# Patient Record
Sex: MALE | HISPANIC OR LATINO | Employment: FULL TIME | URBAN - METROPOLITAN AREA
[De-identification: names, ages, dates, MRNs, and addresses within clinical notes are randomized per-mention and may not be internally consistent; named-entity substitution may affect disease eponyms.]

---

## 2017-08-17 ENCOUNTER — HOSPITAL ENCOUNTER (OUTPATIENT)
Dept: LAB | Facility: MEDICAL CENTER | Age: 62
End: 2017-08-17
Attending: FAMILY MEDICINE
Payer: COMMERCIAL

## 2017-08-17 LAB
CA-I SERPL-SCNC: 1.2 MMOL/L (ref 1.1–1.3)
EST. AVERAGE GLUCOSE BLD GHB EST-MCNC: 123 MG/DL
HBA1C MFR BLD: 5.9 % (ref 0–5.6)
PTH-INTACT SERPL-MCNC: 69.9 PG/ML (ref 14–72)

## 2017-08-17 PROCEDURE — 36415 COLL VENOUS BLD VENIPUNCTURE: CPT

## 2017-08-17 PROCEDURE — 83970 ASSAY OF PARATHORMONE: CPT

## 2017-08-17 PROCEDURE — 83036 HEMOGLOBIN GLYCOSYLATED A1C: CPT

## 2017-08-17 PROCEDURE — 82330 ASSAY OF CALCIUM: CPT

## 2020-06-03 ENCOUNTER — APPOINTMENT (OUTPATIENT)
Dept: RADIOLOGY | Facility: MEDICAL CENTER | Age: 65
End: 2020-06-03
Attending: EMERGENCY MEDICINE
Payer: COMMERCIAL

## 2020-06-03 ENCOUNTER — HOSPITAL ENCOUNTER (EMERGENCY)
Facility: MEDICAL CENTER | Age: 65
End: 2020-06-03
Attending: EMERGENCY MEDICINE
Payer: COMMERCIAL

## 2020-06-03 ENCOUNTER — OFFICE VISIT (OUTPATIENT)
Dept: URGENT CARE | Facility: PHYSICIAN GROUP | Age: 65
End: 2020-06-03
Payer: COMMERCIAL

## 2020-06-03 VITALS
TEMPERATURE: 98.1 F | HEART RATE: 71 BPM | OXYGEN SATURATION: 96 % | RESPIRATION RATE: 16 BRPM | DIASTOLIC BLOOD PRESSURE: 76 MMHG | SYSTOLIC BLOOD PRESSURE: 126 MMHG

## 2020-06-03 VITALS
RESPIRATION RATE: 13 BRPM | HEART RATE: 59 BPM | HEIGHT: 69 IN | WEIGHT: 237.88 LBS | SYSTOLIC BLOOD PRESSURE: 149 MMHG | TEMPERATURE: 97.5 F | OXYGEN SATURATION: 97 % | DIASTOLIC BLOOD PRESSURE: 83 MMHG | BODY MASS INDEX: 35.23 KG/M2

## 2020-06-03 DIAGNOSIS — R47.81 SLURRED SPEECH: ICD-10-CM

## 2020-06-03 DIAGNOSIS — R29.810 FACIAL DROOP: ICD-10-CM

## 2020-06-03 DIAGNOSIS — G51.0 BELL'S PALSY: ICD-10-CM

## 2020-06-03 PROCEDURE — 99204 OFFICE O/P NEW MOD 45 MIN: CPT | Performed by: PHYSICIAN ASSISTANT

## 2020-06-03 PROCEDURE — 70450 CT HEAD/BRAIN W/O DYE: CPT

## 2020-06-03 PROCEDURE — 99283 EMERGENCY DEPT VISIT LOW MDM: CPT

## 2020-06-03 RX ORDER — PREDNISONE 20 MG/1
60 TABLET ORAL DAILY
Qty: 15 TAB | Refills: 0 | Status: SHIPPED | OUTPATIENT
Start: 2020-06-03 | End: 2020-06-08

## 2020-06-03 RX ORDER — ACYCLOVIR 200 MG/1
800 CAPSULE ORAL 3 TIMES DAILY
Qty: 120 CAP | Refills: 0 | Status: SHIPPED | OUTPATIENT
Start: 2020-06-03 | End: 2020-06-13

## 2020-06-03 RX ORDER — LOSARTAN POTASSIUM 100 MG/1
100 TABLET ORAL DAILY
Status: ON HOLD | COMMUNITY
Start: 2020-06-01 | End: 2021-09-26

## 2020-06-03 ASSESSMENT — ENCOUNTER SYMPTOMS
SINUS PAIN: 0
ANOREXIA: 0
CHILLS: 0
SORE THROAT: 0
VISUAL CHANGE: 0
DIARRHEA: 0
FEVER: 0
SPEECH CHANGE: 1
NAUSEA: 0
VERTIGO: 0
FATIGUE: 0
MEMORY LOSS: 0
NUMBNESS: 0
RESPIRATORY NEGATIVE: 1
WEAKNESS: 0
VOMITING: 0
ABDOMINAL PAIN: 0

## 2020-06-03 ASSESSMENT — LIFESTYLE VARIABLES: DO YOU DRINK ALCOHOL: NO

## 2020-06-03 NOTE — ED TRIAGE NOTES
Chief Complaint   Patient presents with   • Numbness     in left side of face x 2 days. worse today   • Facial Droop     in left side of face x 2 days. worse today. pt unable to blink/close left eye. no unilateral weakness noted. no slurred speech. no drift. (-)neglect (-)ataxia. aaox4.     Educated on triage process. Instructed to notify staff for any worsening symptoms. Denies any recent travel. Denies exposure to known covid positive patients. Denies any respiratory symptoms.
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
## 2020-06-03 NOTE — ED NOTES
Pt to . Agree with triage note. Pt sent from . He has even and equal , BLE. Denies headache. Denies vision issues. ERP at bedside.
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
## 2020-06-03 NOTE — ED NOTES
Med Rec complete per phone interview with pt's wife  Allergies Reviewed  No ABX in the last 14 days

## 2020-08-12 ENCOUNTER — HOSPITAL ENCOUNTER (OUTPATIENT)
Dept: LAB | Facility: MEDICAL CENTER | Age: 65
End: 2020-08-12
Attending: PSYCHIATRY & NEUROLOGY
Payer: COMMERCIAL

## 2020-08-12 LAB
ALBUMIN SERPL BCP-MCNC: 3.7 G/DL (ref 3.2–4.9)
ALBUMIN/GLOB SERPL: 1.2 G/DL
ALP SERPL-CCNC: 111 U/L (ref 30–99)
ALT SERPL-CCNC: 42 U/L (ref 2–50)
ANION GAP SERPL CALC-SCNC: 10 MMOL/L (ref 7–16)
AST SERPL-CCNC: 45 U/L (ref 12–45)
BASOPHILS # BLD AUTO: 0.4 % (ref 0–1.8)
BASOPHILS # BLD: 0.02 K/UL (ref 0–0.12)
BILIRUB SERPL-MCNC: 1.3 MG/DL (ref 0.1–1.5)
BUN SERPL-MCNC: 11 MG/DL (ref 8–22)
CALCIUM SERPL-MCNC: 9.2 MG/DL (ref 8.5–10.5)
CHLORIDE SERPL-SCNC: 104 MMOL/L (ref 96–112)
CO2 SERPL-SCNC: 21 MMOL/L (ref 20–33)
CREAT SERPL-MCNC: 0.53 MG/DL (ref 0.5–1.4)
EOSINOPHIL # BLD AUTO: 0.11 K/UL (ref 0–0.51)
EOSINOPHIL NFR BLD: 2.4 % (ref 0–6.9)
ERYTHROCYTE [DISTWIDTH] IN BLOOD BY AUTOMATED COUNT: 46.5 FL (ref 35.9–50)
ERYTHROCYTE [SEDIMENTATION RATE] IN BLOOD BY WESTERGREN METHOD: 4 MM/HOUR (ref 0–20)
FOLATE SERPL-MCNC: 16.5 NG/ML
GLOBULIN SER CALC-MCNC: 3.2 G/DL (ref 1.9–3.5)
GLUCOSE SERPL-MCNC: 148 MG/DL (ref 65–99)
HCT VFR BLD AUTO: 41.7 % (ref 42–52)
HGB BLD-MCNC: 14.6 G/DL (ref 14–18)
IMM GRANULOCYTES # BLD AUTO: 0.02 K/UL (ref 0–0.11)
IMM GRANULOCYTES NFR BLD AUTO: 0.4 % (ref 0–0.9)
LYMPHOCYTES # BLD AUTO: 1.79 K/UL (ref 1–4.8)
LYMPHOCYTES NFR BLD: 38.7 % (ref 22–41)
MCH RBC QN AUTO: 33.4 PG (ref 27–33)
MCHC RBC AUTO-ENTMCNC: 35 G/DL (ref 33.7–35.3)
MCV RBC AUTO: 95.4 FL (ref 81.4–97.8)
MONOCYTES # BLD AUTO: 0.45 K/UL (ref 0–0.85)
MONOCYTES NFR BLD AUTO: 9.7 % (ref 0–13.4)
NEUTROPHILS # BLD AUTO: 2.23 K/UL (ref 1.82–7.42)
NEUTROPHILS NFR BLD: 48.4 % (ref 44–72)
NRBC # BLD AUTO: 0 K/UL
NRBC BLD-RTO: 0 /100 WBC
PLATELET # BLD AUTO: 111 K/UL (ref 164–446)
PMV BLD AUTO: 10.7 FL (ref 9–12.9)
POTASSIUM SERPL-SCNC: 3.9 MMOL/L (ref 3.6–5.5)
PROT SERPL-MCNC: 6.9 G/DL (ref 6–8.2)
RBC # BLD AUTO: 4.37 M/UL (ref 4.7–6.1)
SODIUM SERPL-SCNC: 135 MMOL/L (ref 135–145)
TREPONEMA PALLIDUM IGG+IGM AB [PRESENCE] IN SERUM OR PLASMA BY IMMUNOASSAY: NORMAL
VIT B12 SERPL-MCNC: 1736 PG/ML (ref 211–911)
WBC # BLD AUTO: 4.6 K/UL (ref 4.8–10.8)

## 2020-08-12 PROCEDURE — 80053 COMPREHEN METABOLIC PANEL: CPT

## 2020-08-12 PROCEDURE — 85025 COMPLETE CBC W/AUTO DIFF WBC: CPT

## 2020-08-12 PROCEDURE — 86038 ANTINUCLEAR ANTIBODIES: CPT

## 2020-08-12 PROCEDURE — 85652 RBC SED RATE AUTOMATED: CPT

## 2020-08-12 PROCEDURE — 82746 ASSAY OF FOLIC ACID SERUM: CPT

## 2020-08-12 PROCEDURE — 36415 COLL VENOUS BLD VENIPUNCTURE: CPT

## 2020-08-12 PROCEDURE — 82164 ANGIOTENSIN I ENZYME TEST: CPT

## 2020-08-12 PROCEDURE — 86780 TREPONEMA PALLIDUM: CPT

## 2020-08-12 PROCEDURE — 82607 VITAMIN B-12: CPT

## 2020-08-14 LAB
ACE SERPL-CCNC: 49 U/L (ref 9–67)
NUCLEAR IGG SER QL IA: NORMAL

## 2020-08-17 ENCOUNTER — HOSPITAL ENCOUNTER (OUTPATIENT)
Dept: RADIOLOGY | Facility: MEDICAL CENTER | Age: 65
End: 2020-08-17
Attending: PSYCHIATRY & NEUROLOGY
Payer: COMMERCIAL

## 2020-08-17 DIAGNOSIS — G51.0 BELL'S PALSY: ICD-10-CM

## 2020-08-17 PROCEDURE — A9576 INJ PROHANCE MULTIPACK: HCPCS | Performed by: PSYCHIATRY & NEUROLOGY

## 2020-08-17 PROCEDURE — 700117 HCHG RX CONTRAST REV CODE 255: Performed by: PSYCHIATRY & NEUROLOGY

## 2020-08-17 PROCEDURE — 70553 MRI BRAIN STEM W/O & W/DYE: CPT

## 2020-08-17 RX ADMIN — GADOTERIDOL 20 ML: 279.3 INJECTION, SOLUTION INTRAVENOUS at 17:55

## 2020-10-19 ENCOUNTER — PRE-ADMISSION TESTING (OUTPATIENT)
Dept: ADMISSIONS | Facility: MEDICAL CENTER | Age: 65
End: 2020-10-19
Attending: OPHTHALMOLOGY
Payer: COMMERCIAL

## 2020-10-19 DIAGNOSIS — Z01.810 PRE-OPERATIVE CARDIOVASCULAR EXAMINATION: ICD-10-CM

## 2020-10-19 DIAGNOSIS — Z01.812 PRE-OPERATIVE LABORATORY EXAMINATION: ICD-10-CM

## 2020-10-19 LAB
ANION GAP SERPL CALC-SCNC: 10 MMOL/L (ref 7–16)
BUN SERPL-MCNC: 10 MG/DL (ref 8–22)
CALCIUM SERPL-MCNC: 9 MG/DL (ref 8.5–10.5)
CHLORIDE SERPL-SCNC: 102 MMOL/L (ref 96–112)
CO2 SERPL-SCNC: 25 MMOL/L (ref 20–33)
COVID ORDER STATUS COVID19: NORMAL
CREAT SERPL-MCNC: 0.44 MG/DL (ref 0.5–1.4)
EKG IMPRESSION: NORMAL
GLUCOSE SERPL-MCNC: 95 MG/DL (ref 65–99)
POTASSIUM SERPL-SCNC: 4.2 MMOL/L (ref 3.6–5.5)
SARS-COV-2 RNA RESP QL NAA+PROBE: NOTDETECTED
SODIUM SERPL-SCNC: 137 MMOL/L (ref 135–145)
SPECIMEN SOURCE: NORMAL

## 2020-10-19 PROCEDURE — 36415 COLL VENOUS BLD VENIPUNCTURE: CPT

## 2020-10-19 PROCEDURE — 80048 BASIC METABOLIC PNL TOTAL CA: CPT

## 2020-10-19 PROCEDURE — C9803 HOPD COVID-19 SPEC COLLECT: HCPCS

## 2020-10-19 PROCEDURE — 93005 ELECTROCARDIOGRAM TRACING: CPT

## 2020-10-19 PROCEDURE — U0003 INFECTIOUS AGENT DETECTION BY NUCLEIC ACID (DNA OR RNA); SEVERE ACUTE RESPIRATORY SYNDROME CORONAVIRUS 2 (SARS-COV-2) (CORONAVIRUS DISEASE [COVID-19]), AMPLIFIED PROBE TECHNIQUE, MAKING USE OF HIGH THROUGHPUT TECHNOLOGIES AS DESCRIBED BY CMS-2020-01-R: HCPCS

## 2020-10-19 PROCEDURE — 93010 ELECTROCARDIOGRAM REPORT: CPT | Performed by: INTERNAL MEDICINE

## 2020-10-19 ASSESSMENT — FIBROSIS 4 INDEX: FIB4 SCORE: 3.94

## 2020-10-21 ENCOUNTER — HOSPITAL ENCOUNTER (OUTPATIENT)
Facility: MEDICAL CENTER | Age: 65
End: 2020-10-21
Attending: OPHTHALMOLOGY | Admitting: OPHTHALMOLOGY
Payer: COMMERCIAL

## 2020-10-21 ENCOUNTER — ANESTHESIA (OUTPATIENT)
Dept: SURGERY | Facility: MEDICAL CENTER | Age: 65
End: 2020-10-21
Payer: COMMERCIAL

## 2020-10-21 ENCOUNTER — ANESTHESIA EVENT (OUTPATIENT)
Dept: SURGERY | Facility: MEDICAL CENTER | Age: 65
End: 2020-10-21
Payer: COMMERCIAL

## 2020-10-21 VITALS
HEART RATE: 66 BPM | HEIGHT: 69 IN | SYSTOLIC BLOOD PRESSURE: 119 MMHG | DIASTOLIC BLOOD PRESSURE: 70 MMHG | BODY MASS INDEX: 33.99 KG/M2 | RESPIRATION RATE: 20 BRPM | TEMPERATURE: 99.2 F | WEIGHT: 229.5 LBS | OXYGEN SATURATION: 93 %

## 2020-10-21 PROCEDURE — 160002 HCHG RECOVERY MINUTES (STAT): Performed by: OPHTHALMOLOGY

## 2020-10-21 PROCEDURE — 501838 HCHG SUTURE GENERAL: Performed by: OPHTHALMOLOGY

## 2020-10-21 PROCEDURE — 160025 RECOVERY II MINUTES (STATS): Performed by: OPHTHALMOLOGY

## 2020-10-21 PROCEDURE — 160041 HCHG SURGERY MINUTES - EA ADDL 1 MIN LEVEL 4: Performed by: OPHTHALMOLOGY

## 2020-10-21 PROCEDURE — 700101 HCHG RX REV CODE 250: Performed by: OPHTHALMOLOGY

## 2020-10-21 PROCEDURE — A9270 NON-COVERED ITEM OR SERVICE: HCPCS | Performed by: OPHTHALMOLOGY

## 2020-10-21 PROCEDURE — 160046 HCHG PACU - 1ST 60 MINS PHASE II: Performed by: OPHTHALMOLOGY

## 2020-10-21 PROCEDURE — 700111 HCHG RX REV CODE 636 W/ 250 OVERRIDE (IP): Performed by: ANESTHESIOLOGY

## 2020-10-21 PROCEDURE — 700102 HCHG RX REV CODE 250 W/ 637 OVERRIDE(OP): Performed by: OPHTHALMOLOGY

## 2020-10-21 PROCEDURE — 700105 HCHG RX REV CODE 258: Performed by: OPHTHALMOLOGY

## 2020-10-21 PROCEDURE — 160035 HCHG PACU - 1ST 60 MINS PHASE I: Performed by: OPHTHALMOLOGY

## 2020-10-21 PROCEDURE — 160048 HCHG OR STATISTICAL LEVEL 1-5: Performed by: OPHTHALMOLOGY

## 2020-10-21 PROCEDURE — 160036 HCHG PACU - EA ADDL 30 MINS PHASE I: Performed by: OPHTHALMOLOGY

## 2020-10-21 PROCEDURE — 160009 HCHG ANES TIME/MIN: Performed by: OPHTHALMOLOGY

## 2020-10-21 PROCEDURE — 160029 HCHG SURGERY MINUTES - 1ST 30 MINS LEVEL 4: Performed by: OPHTHALMOLOGY

## 2020-10-21 RX ORDER — MIDAZOLAM HYDROCHLORIDE 1 MG/ML
INJECTION INTRAMUSCULAR; INTRAVENOUS PRN
Status: DISCONTINUED | OUTPATIENT
Start: 2020-10-21 | End: 2020-10-21 | Stop reason: SURG

## 2020-10-21 RX ORDER — CEFAZOLIN SODIUM 1 G/3ML
INJECTION, POWDER, FOR SOLUTION INTRAMUSCULAR; INTRAVENOUS PRN
Status: DISCONTINUED | OUTPATIENT
Start: 2020-10-21 | End: 2020-10-21 | Stop reason: SURG

## 2020-10-21 RX ORDER — ERYTHROMYCIN 5 MG/G
OINTMENT OPHTHALMIC
Status: DISCONTINUED | OUTPATIENT
Start: 2020-10-21 | End: 2020-10-21 | Stop reason: HOSPADM

## 2020-10-21 RX ORDER — SODIUM CHLORIDE, SODIUM LACTATE, POTASSIUM CHLORIDE, CALCIUM CHLORIDE 600; 310; 30; 20 MG/100ML; MG/100ML; MG/100ML; MG/100ML
INJECTION, SOLUTION INTRAVENOUS CONTINUOUS
Status: DISCONTINUED | OUTPATIENT
Start: 2020-10-21 | End: 2020-10-21 | Stop reason: HOSPADM

## 2020-10-21 RX ORDER — ONDANSETRON 2 MG/ML
4 INJECTION INTRAMUSCULAR; INTRAVENOUS
Status: DISCONTINUED | OUTPATIENT
Start: 2020-10-21 | End: 2020-10-21 | Stop reason: HOSPADM

## 2020-10-21 RX ORDER — HALOPERIDOL 5 MG/ML
1 INJECTION INTRAMUSCULAR
Status: DISCONTINUED | OUTPATIENT
Start: 2020-10-21 | End: 2020-10-21 | Stop reason: HOSPADM

## 2020-10-21 RX ORDER — LIDOCAINE HYDROCHLORIDE AND EPINEPHRINE BITARTRATE 20; .01 MG/ML; MG/ML
INJECTION, SOLUTION SUBCUTANEOUS
Status: DISCONTINUED | OUTPATIENT
Start: 2020-10-21 | End: 2020-10-21 | Stop reason: HOSPADM

## 2020-10-21 RX ORDER — ERYTHROMYCIN 5 MG/G
OINTMENT OPHTHALMIC
Status: DISCONTINUED
Start: 2020-10-21 | End: 2020-10-21 | Stop reason: HOSPADM

## 2020-10-21 RX ORDER — DIPHENHYDRAMINE HYDROCHLORIDE 50 MG/ML
12.5 INJECTION INTRAMUSCULAR; INTRAVENOUS
Status: DISCONTINUED | OUTPATIENT
Start: 2020-10-21 | End: 2020-10-21 | Stop reason: HOSPADM

## 2020-10-21 RX ADMIN — SODIUM CHLORIDE, POTASSIUM CHLORIDE, SODIUM LACTATE AND CALCIUM CHLORIDE: 600; 310; 30; 20 INJECTION, SOLUTION INTRAVENOUS at 13:55

## 2020-10-21 RX ADMIN — FENTANYL CITRATE 100 MCG: 50 INJECTION, SOLUTION INTRAMUSCULAR; INTRAVENOUS at 16:21

## 2020-10-21 RX ADMIN — PROPOFOL 120 MG: 10 INJECTION, EMULSION INTRAVENOUS at 16:17

## 2020-10-21 RX ADMIN — POVIDONE IODINE 15 ML: 100 SOLUTION TOPICAL at 13:55

## 2020-10-21 RX ADMIN — CEFAZOLIN 2 G: 330 INJECTION, POWDER, FOR SOLUTION INTRAMUSCULAR; INTRAVENOUS at 16:32

## 2020-10-21 RX ADMIN — MIDAZOLAM HYDROCHLORIDE 4 MG: 1 INJECTION, SOLUTION INTRAMUSCULAR; INTRAVENOUS at 16:15

## 2020-10-21 ASSESSMENT — PAIN DESCRIPTION - PAIN TYPE
TYPE: SURGICAL PAIN
TYPE: ACUTE PAIN;SURGICAL PAIN
TYPE: SURGICAL PAIN
TYPE: SURGICAL PAIN
TYPE: ACUTE PAIN;SURGICAL PAIN

## 2020-10-21 ASSESSMENT — PAIN SCALES - GENERAL: PAIN_LEVEL: 0

## 2020-10-21 ASSESSMENT — FIBROSIS 4 INDEX: FIB4 SCORE: 3.94

## 2020-10-21 NOTE — ANESTHESIA PROCEDURE NOTES
Airway    Date/Time: 10/21/2020 4:17 PM  Performed by: Benja Montoya M.D.  Authorized by: Benja Montoya M.D.     Location:  OR  Urgency:  Elective  Difficult Airway: No    Indications for Airway Management:  Anesthesia      Spontaneous Ventilation: absent    Sedation Level:  Deep  Preoxygenated: Yes    Mask Difficulty Assessment:  0 - not attempted  Final Airway Type:  Supraglottic airway  Final Supraglottic Airway:  Standard LMA    SGA Size:  5  Number of Attempts at Approach:  1  Number of Other Approaches Attempted:  0

## 2020-10-21 NOTE — ANESTHESIA PREPROCEDURE EVALUATION
Relevant Problems   CARDIAC   (+) Essential hypertension       Physical Exam    Airway   Mallampati: II  TM distance: >3 FB  Neck ROM: full       Cardiovascular - normal exam  Rhythm: regular  Rate: normal  (-) murmur     Dental - normal exam           Pulmonary - normal exam  Breath sounds clear to auscultation     Abdominal    Neurological - normal exam                 Anesthesia Plan    ASA 2       Plan - general       Airway plan will be LMA        Induction: intravenous    Postoperative Plan: Postoperative administration of opioids is intended.    Pertinent diagnostic labs and testing reviewed    Informed Consent:    Anesthetic plan and risks discussed with patient.    Use of blood products discussed with: patient whom consented to blood products.

## 2020-10-21 NOTE — OR NURSING
COVID-19 Pre-surgery screenin. Do you have an undiagnosed respiratory illness or symptoms such as coughing or sneezing? NO (Yes/No)  a. Onset of Sx -  b. Acute vs. chronic respiratory illness -    2. Do you have an unexplained fever greater than 100.4 degrees Fahrenheit or 38 degrees Celsius?     NO (Yes/No)    3. Have you had direct exposure to a patient who tested positive for Covid-19?    NO (Yes/No)    4. Have you had any loss of your sense of taste or smell? Have you had N/V or sore throat? NO    Patient has been informed of visitor policy and asked to wear a mask upon entering the hospital   YES(Yes/No)

## 2020-10-22 NOTE — OR NURSING
1723 to pacu from or remains asleep without airway breathing even unlabored o2 via mask at 4 liters sats 99% connected to all monitoring equipment. Report recvd from Sofy Bustillos rn and Dr bloom dressing to left eye guaze and tape cdi. surg site to r eye open to air without bleeding or swelling   1826 remains sleepy sitting up in [facility] c/o slight pain bilat eyes declines need for pain meds without nausea   1830 qualifies for stage 2 transferred   1837 more awake wife to bedside pt states he ready to dc to home   1840 reviewed all dc instructions   1850 iv removed dressed eye shields and tape cold pack provided   1856 Escorted out to car in wheelchair with all belongings in wheelchair accomp with wife

## 2020-10-22 NOTE — DISCHARGE INSTRUCTIONS
OCULOPLASTICS SURGERY POSTOP INSTRUCTIONS:  - No heavy lifting, bending, stooping, straining, or exercise for 2 weeks following surgery.   - Keep head elevated.  - Keep wound or dressing clean and dry.  Do not get wet.    - Apply cool compresses to operative eyelid(s) for 20 minutes on and 20 minutes off while awake for the first 48 hrs.  Then switch to warm compresses 4 times per day until postop appt.   - DO NOT rub or pull on the operative eyelid(s).  - Wear eye shield at night over the operative eye(s) while sleeping for 3 weeks after surgery. (RN: please provide Barba shield and tape for patient).   - Do not take any aspirin or NSAID (e.g., Motrin, ibuprofen, Advil, naproxen, etc) for 1 week after surgery.   - Please take only over-the-counter Tylenol (acetominophen) as needed for pain.   - Please call Dr. Donato's office with any questions or concerns.  [phone].     - Remove the dressing on the Left cheek in 2 days gently in a UPWARD direction.     Postop Medications: Prescriptions have all been electronically sent to the patient's preferred pharmacy prior to surgery.     - Maxitrol ophthalmic ointment apply thin layer 3 times per day to incision line on the outer corner of the left eye and 3 times per day into the right eye.    - Over the counter Tylenol (acetaminophen) as needed for pain.     ACTIVITY: Rest and take it easy for the first 24 hours.  A responsible adult is recommended to remain with you during that time.  It is normal to feel sleepy.  We encourage you to not do anything that requires balance, judgment or coordination.    MILD FLU-LIKE SYMPTOMS ARE NORMAL. YOU MAY EXPERIENCE GENERALIZED MUSCLE ACHES, THROAT IRRITATION, HEADACHE AND/OR SOME NAUSEA.    FOR 24 HOURS DO NOT:  Drive, operate machinery or run household appliances.  Drink beer or alcoholic beverages.   Make important decisions or sign legal documents.    SPECIAL INSTRUCTIONS: *see Oculoplastics surgery post op instructions **    DIET:  To avoid nausea, slowly advance diet as tolerated, avoiding spicy or greasy foods for the first day.  Add more substantial food to your diet according to your physician's instructions.  Babies can be fed formula or breast milk as soon as they are hungry.  INCREASE FLUIDS AND FIBER TO AVOID CONSTIPATION.    SURGICAL DRESSING/BATHING: *see Oculoplastics surgery post op instructions**    FOLLOW-UP APPOINTMENT:  A follow-up appointment should be arranged with your doctor  call to schedule or follow up as already scheduled     You should CALL YOUR PHYSICIAN if you develop:  Fever greater than 101 degrees F.  Pain not relieved by medication, or persistent nausea or vomiting.  Excessive bleeding (blood soaking through dressing) or unexpected drainage from the wound.  Extreme redness or swelling around the incision site, drainage of pus or foul smelling drainage.  Inability to urinate or empty your bladder within 8 hours.  Problems with breathing or chest pain.    You should call 911 if you develop problems with breathing or chest pain.  If you are unable to contact your doctor or surgical center, you should go to the nearest emergency room or urgent care center.  Physician's telephone #: *[phone]**    If any questions arise, call your doctor.  If your doctor is not available, please feel free to call the Surgical Center at [phone]. The Contact Center is open Monday through Friday 7AM to 5PM and may speak to a nurse at [phone], or toll free at [phone].     A registered nurse may call you a few days after your surgery to see how you are doing after your procedure.    MEDICATIONS: Resume taking daily medication.  Take prescribed pain medication with food.  If no medication is prescribed, you may take non-aspirin pain medication if needed.  PAIN MEDICATION CAN BE VERY CONSTIPATING.  Take a stool softener or laxative such as senokot, pericolace, or milk of magnesia if needed.    Prescription given for  *none **.  Last pain medication given at *___none_______**.    If your physician has prescribed pain medication that includes Acetaminophen (Tylenol), do not take additional Acetaminophen (Tylenol) while taking the prescribed medication.    Depression / Suicide Risk    As you are discharged from this [facility] facility, it is important to learn how to keep safe from harming yourself.    Recognize the warning signs:  · Abrupt changes in personality, positive or negative- including increase in energy   · Giving away possessions  · Change in eating patterns- significant weight changes-  positive or negative  · Change in sleeping patterns- unable to sleep or sleeping all the time   · Unwillingness or inability to communicate  · Depression  · Unusual sadness, discouragement and loneliness  · Talk of wanting to die  · Neglect of personal appearance   · Rebelliousness- reckless behavior  · Withdrawal from people/activities they love  · Confusion- inability to concentrate     If you or a loved one observes any of these behaviors or has concerns about self-harm, here's what you can do:  · Talk about it- your feelings and reasons for harming yourself  · Remove any means that you might use to hurt yourself (examples: pills, rope, extension cords, firearm)  · Get professional help from the community (Mental Health, Substance Abuse, psychological counseling)  · Do not be alone:Call your Safe Contact- someone whom you trust who will be there for you.  · Call your local CRISIS HOTLINE 075-0030 or 705-126-1855  · Call your local Children's Mobile Crisis Response Team [place] (208) 818-2204 or www.[employer]  · Call the toll free National Suicide Prevention Hotlines   · National Suicide Prevention Lifeline 949-047-NMPC (8791)  · National Hope Line Network 800-SUICIDE (535-8752)

## 2020-10-22 NOTE — OR SURGEON
Immediate Post OP Note    PreOp Diagnosis: L facial palsy, L ectropion, L midface descent, RUL Chalazion x 2    PostOp Diagnosis: Same    Procedure(s):  REPAIR, ECTROPION, EYELID- LEFT LOWER LID WITH LATERAL TARSAL STRIP, EXCISION OF CHALAZION RIGHT UPPER LID - Wound Class: Clean  CLOSURE, FLAP- FOR MIDFACE FLAP - Wound Class: Clean    Surgeon(s):  Nabor Donato M.D.    Anesthesiologist/Type of Anesthesia:  Anesthesiologist: Benja Montoya M.D./General    Surgical Staff:  Circulator: Soyf Carlisle R.N.  Scrub Person: Arleen Guzman    Specimens removed if any:  * No specimens in log *    Estimated Blood Loss: <5 cc    Findings: As above    Complications: None        10/21/2020 5:34 PM Nabor Donato M.D.

## 2020-10-22 NOTE — ANESTHESIA QCDR
2019 [facility] Clinical Data Registry (for Quality Improvement)     Postoperative nausea/vomiting risk protocol (Adult = 18 yrs and Pediatric 3-17 yrs)- (430 and 463)  General inhalation anesthetic (NOT TIVA) with PONV risk factors: No  Provision of anti-emetic therapy with at least 2 different classes of agents: N/A  Patient DID NOT receive anti-emetic therapy and reason is documented in Medical Record: N/A    Multimodal Pain Management- (477)  Non-emergent surgery AND patient age >= 18: Yes  Use of Multimodal Pain Management, two or more drugs and/or interventions, NOT including systemic opioids: Yes  Exception: Documented allergy to multiple classes of analgesics: N/A    Smoking Abstinence (404)  Patient is current smoker (cigarette, pipe, e-cig, marijuanna): No  Elective Surgery:   Abstinence instructions provided prior to day of surgery:   Patient abstained from smoking on day of surgery:     Pre-Op Beta-Blocker in Isolated CABG (44)  Isolated CABG AND patient age >= 18: No  Beta-blocker admin within 24 hours of surgical incision:   Exception:of medical reason(s) for not administering beta blocker within 24 hours prior to surgical incision (e.g., not  indicated,other medical reason):     PACU assessment of acute postoperative pain prior to Anesthesia Care End- Applies to Patients Age = 18- (ABG7)  Initial PACU pain score is which of the following: < 7/10  Patient unable to report pain score: N/A    Post-anesthetic transfer of care checklist/protocol to PACU/ICU- (426 and 427)  Upon conclusion of case, patient transferred to which of the following locations: PACU/Non-ICU  Use of transfer checklist/protocol: Yes  Exclusion: Service Performed in Patient Hospital Room (and thus did not require transfer): N/A  Unplanned admission to ICU related to anesthesia service up through end of PACU care- (MD51)  Unplanned admission to ICU (not initially anticipated at anesthesia start time): No

## 2020-10-22 NOTE — ANESTHESIA POSTPROCEDURE EVALUATION
Patient: Crispin Summers    Procedure Summary     Date: 10/21/20 Room / Location: [facility] ROOM 24 / SURGERY SAME DAY [facility]    Anesthesia Start: 1613 Anesthesia Stop: 1724    Procedures:       REPAIR, ECTROPION, EYELID- LEFT LOWER LID WITH LATERAL TARSAL STRIP, EXCISION OF CHALAZION RIGHT UPPER LID (Left Eye)      CLOSURE, FLAP- FOR MIDFACE FLAP (Left Eye) Diagnosis: (BELL PALSY, PARALYTIC LAG, PARALYTIC, KERATOPLASTY, CHALAZION)    Surgeon: Nabor Donato M.D. Responsible Provider: Benja Montoya M.D.    Anesthesia Type: general ASA Status: 2          Final Anesthesia Type: general  Last vitals  BP   Blood Pressure: 135/82    Temp   37.3 °C (99.2 °F)    Pulse   Pulse: 66   Resp   18    SpO2   94 %      Anesthesia Post Evaluation    Patient location during evaluation: PACU  Patient participation: complete - patient participated  Level of consciousness: awake and alert  Pain score: 0    Airway patency: patent  Anesthetic complications: no  Cardiovascular status: hemodynamically stable  Respiratory status: acceptable  Hydration status: euvolemic    PONV: none           Nurse Pain Score: 0 (NPRS)

## 2020-10-22 NOTE — ANESTHESIA TIME REPORT
Anesthesia Start and Stop Event Times     Date Time Event    10/21/2020 1606 Ready for Procedure     1613 Anesthesia Start     1724 Anesthesia Stop        Responsible Staff  10/21/20    Name Role Begin End    Benja Montoya M.D. Anesth 1613 1724        Preop Diagnosis (Free Text):  Pre-op Diagnosis     BELL PALSY, PARALYTIC LAG, PARALYTIC, KERATOPLASTY, CHALAZION        Preop Diagnosis (Codes):    Post op Diagnosis  Chalazion of left eye      Premium Reason  A. 3PM - 7AM    Comments:

## 2020-10-23 NOTE — OP REPORT
DATE OF SERVICE:  10/21/2020    ATTENDING SURGEON:  Nabor Donato MD    ASSISTANT SURGEON:  None.    ANESTHESIOLOGIST:  Benja Montoya MD    ANESTHESIA:  General with local supplementation.    PREOPERATIVE DIAGNOSES:  1.  Left facial palsy.  2.  Paralytic lagophthalmos, left lower eyelid.  3.  Paralytic ectropion, left lower eyelid.  4.  Exposure keratopathy, left eye.  5.  Chalazion x2 to right upper eyelid.    POSTOPERATIVE DIAGNOSES:  1.  Left facial palsy.  2.  Paralytic lagophthalmos, left lower eyelid.  3.  Paralytic ectropion, left lower eyelid.  4.  Exposure keratopathy, left eye.  5.  Chalazion x2 to right upper eyelid.    PROCEDURES PERFORMED:  1.  Ectropion repair with lateral tarsal strip, left lower eyelid.  2.  Midface advancement flap to support left lower eyelid.  3.  Excision of chalazion, multiple x2, right upper eyelid.    SPECIMENS:  None.    IMPLANTS:  None.    COMPLICATIONS:  None.    ESTIMATED BLOOD LOSS:  Less than 5 mL.    INDICATIONS FOR PROCEDURE:  This is a 64-year-old male with a history of   facial palsy on the left side.  The was suffering from significant paralytic   lagophthalmos and ectropion of the left lower eyelid with concomitant mid face   descent due to the lack of muscle tone in the left face, which was   exacerbating the lower eyelid malposition.  Additionally, the patient   developed chalazia of the right upper eyelid that would not resolve with   nonsurgical treatment.  Because of these findings, it was recommended to the   patient to undergo the above listed procedures.  The risks, benefits, and   alternatives were explained to the patient in detail and informed consent was   signed.    PROCEDURE IN DETAIL:  The patient was brought to the operating room and laid   supine on the operating table.  After induction of general anesthesia, the   area of the left lower eyelid was infiltrated with 2% lidocaine with   epinephrine across the width of the lower eyelid in a  transconjunctival and   transcutaneous manner.  Additional infiltration was placed in the lateral   canthus down to the lateral orbital rim periosteum.  Further infiltration of   local anesthetic was placed along the inferolateral and inferior orbital rim   and over the malar eminence.  The full face was then prepped and draped in   usual sterile fashion.  Attention was turned to the left lower eyelid first.    A lateral canthotomy was performed using Matta tenotomy scissors.  Sharp   dissection was carried down to the lateral orbital rim periosteum.  Bipolar   cautery was used for hemostasis.  A lateral tarsal strip was developed in the   lateral lower eyelid by splitting the anterior and posterior lamella of the   lateral lower eyelid sharply between the anterior and posterior lamella for   several millimeters.  The redundant mucocutaneous junction and lash-bearing   skin was excised sharply.  The length of the tarsal strip was made to   commensurate with amount of lid tightening that was necessary.  Once this was   adequately prepared, it was not sutured yet, attention was then turned to   performing the midface advancement flap.    The canthotomy incision was extended several millimeters over the lateral   aspect of the lateral orbital rim.  Sharp dissection was carried down to the   periosteum laterally and then blunt and sharp dissection was used with Matta   tenotomy scissors to elevate the flap following along the inferolateral   orbital rim, releasing the orbital malar ligaments and elevating the flap in a   preperiosteal plane down to the zygomatic arch and prominence of the malar   eminence stopping just short of the infraorbital nerve neurovascular bundle.    Once this was adequately elevated and the mid face could be supported and   advanced in the superolateral direction, attention was then turned back to the   completion of the ectropion repair.    The tarsal strip was then captured on a  double-armed 5-0 Mersilene suture in a   horizontal mattress fashion.  Each arm of the suture was brought through the   lateral orbital rim periosteum and then tied down to itself.  This nicely   plicated the lower eyelid and the reconstruction of the lateral canthus and   the lateral canthal angle was reapproximated with 7-0 Vicryl suture through   the gray line of the upper and lower eyelid.    Attention was turned back to the advancement flap where the advancement flap   was supported in a superolateral direction and then two 5-0 Mersilene sutures   were passed through the suborbicularis oculi fibrofatty tissue and plicated to   the lateral aspect of the lateral orbital rim periosteum nicely supporting   the cheek tissues in the upward and lateral direction to prevent gravitational   descent exacerbating and putting tension on the lower eyelid repair.  The   orbicularis layer and the lateral canthotomy incision was then closed with   interrupted buried 5-0 Vicryl sutures, taking bites of the deep periosteum as   well.  The skin was reapproximated along the canthotomy incision with multiple   interrupted 7-0 Vicryl sutures in the skin.    Attention was now turned to the chalazion x2 of the right upper eyelid.  The   upper eyelid on the right was everted and 2% lidocaine with epinephrine was   infiltrated superior to the superior tarsal border.  Additional infiltration   was placed around each of the chalazion.  A medium chalazion clamp was used to   clamp over the lateral chalazion, the eyelid was everted.  A #11 blade was   used to incise over the chalazion.  A chalazion curette was used to curette a   chalazion material.  There was not a large amount of material here and there   was some scar tissue from the chalazion that was there.  This was excised   sharply with Hawa scissors.  Bipolar cautery was used for hemostasis.  The   clamp was removed and reversed.  Since there appeared to be some residual    chalazion material on the anterior aspect of the eyelid, an incision was made   over the chalazion on the skin side.  There was a scant amount of   lipogranulomatous material, which was excised sharply and bipolar cautery was   used for hemostasis.  The clamp was removed and then placed over the central   eyelid chalazion, which was much larger.  The eyelid was everted.  A #11 blade   was used to incise the tarsus and there was immediate egress of purulent   fluid along with lipogranulomatous material.  This was curetted out generously   and completely.  The chalazion capsule was excised with Hawa scissors.    Bipolar cautery was used for hemostasis.  The clamp was removed.  The eyes   were rinsed out and dressed with antibiotic ointment.  Patient was extubated   and brought to PACU in stable condition.  There were no complications.       ____________________________________     MD MARYCHUY Dockery / TIFFANIE    DD:  10/23/2020 08:10:37  DT:  10/23/2020 09:59:09    D#:  [id number]  Job#:  [id number]

## 2020-11-04 ENCOUNTER — NON-PROVIDER VISIT (OUTPATIENT)
Dept: OCCUPATIONAL MEDICINE | Facility: CLINIC | Age: 65
End: 2020-11-04

## 2020-11-04 DIAGNOSIS — Z11.1 ENCOUNTER FOR PPD TEST: Primary | ICD-10-CM

## 2020-11-04 PROCEDURE — 86580 TB INTRADERMAL TEST: CPT | Performed by: NURSE PRACTITIONER

## 2020-11-06 ENCOUNTER — APPOINTMENT (OUTPATIENT)
Dept: RADIOLOGY | Facility: IMAGING CENTER | Age: 65
End: 2020-11-06
Attending: NURSE PRACTITIONER
Payer: COMMERCIAL

## 2020-11-06 ENCOUNTER — NON-PROVIDER VISIT (OUTPATIENT)
Dept: OCCUPATIONAL MEDICINE | Facility: CLINIC | Age: 65
End: 2020-11-06

## 2020-11-06 DIAGNOSIS — Z11.1 ENCOUNTER FOR PPD SKIN TEST READING: ICD-10-CM

## 2020-11-06 LAB — TB WHEAL 3D P 5 TU DIAM: NORMAL MM

## 2021-03-15 DIAGNOSIS — Z23 NEED FOR VACCINATION: ICD-10-CM

## 2021-03-24 ENCOUNTER — IMMUNIZATION (OUTPATIENT)
Dept: FAMILY PLANNING/WOMEN'S HEALTH CLINIC | Facility: IMMUNIZATION CENTER | Age: 66
End: 2021-03-24
Attending: INTERNAL MEDICINE
Payer: COMMERCIAL

## 2021-03-24 DIAGNOSIS — Z23 ENCOUNTER FOR VACCINATION: Primary | ICD-10-CM

## 2021-03-24 DIAGNOSIS — Z23 NEED FOR VACCINATION: ICD-10-CM

## 2021-03-24 PROCEDURE — 0001A PFIZER SARS-COV-2 VACCINE: CPT

## 2021-03-24 PROCEDURE — 91300 PFIZER SARS-COV-2 VACCINE: CPT

## 2021-04-16 ENCOUNTER — IMMUNIZATION (OUTPATIENT)
Dept: FAMILY PLANNING/WOMEN'S HEALTH CLINIC | Facility: IMMUNIZATION CENTER | Age: 66
End: 2021-04-16
Payer: COMMERCIAL

## 2021-04-16 DIAGNOSIS — Z23 ENCOUNTER FOR VACCINATION: Primary | ICD-10-CM

## 2021-04-16 PROCEDURE — 0002A PFIZER SARS-COV-2 VACCINE: CPT | Performed by: INTERNAL MEDICINE

## 2021-04-16 PROCEDURE — 91300 PFIZER SARS-COV-2 VACCINE: CPT | Performed by: INTERNAL MEDICINE

## 2021-08-03 ENCOUNTER — HOSPITAL ENCOUNTER (OUTPATIENT)
Dept: RADIOLOGY | Facility: MEDICAL CENTER | Age: 66
End: 2021-08-03
Attending: INTERNAL MEDICINE
Payer: COMMERCIAL

## 2021-08-03 DIAGNOSIS — K70.31 ALCOHOLIC CIRRHOSIS OF LIVER WITH ASCITES (HCC): ICD-10-CM

## 2021-08-03 LAB
ALBUMIN FLD-MCNC: 0.7 G/DL
APPEARANCE FLD: CLEAR
BODY FLD TYPE: NORMAL
BODY FLD TYPE: NORMAL
COLOR FLD: YELLOW
LYMPHOCYTES NFR FLD: 97 %
MONONUC CELLS NFR FLD: 3 %
RBC # FLD: <2000 CELLS/UL
WBC # FLD: 173 CELLS/UL

## 2021-08-03 PROCEDURE — 89051 BODY FLUID CELL COUNT: CPT

## 2021-08-03 PROCEDURE — 87070 CULTURE OTHR SPECIMN AEROBIC: CPT

## 2021-08-03 PROCEDURE — P9047 ALBUMIN (HUMAN), 25%, 50ML: HCPCS | Performed by: RADIOLOGY

## 2021-08-03 PROCEDURE — 82042 OTHER SOURCE ALBUMIN QUAN EA: CPT

## 2021-08-03 PROCEDURE — 49083 ABD PARACENTESIS W/IMAGING: CPT

## 2021-08-03 PROCEDURE — 700111 HCHG RX REV CODE 636 W/ 250 OVERRIDE (IP): Performed by: RADIOLOGY

## 2021-08-03 PROCEDURE — 87205 SMEAR GRAM STAIN: CPT

## 2021-08-03 PROCEDURE — 87015 SPECIMEN INFECT AGNT CONCNTJ: CPT

## 2021-08-03 RX ORDER — ALBUMIN (HUMAN) 12.5 G/50ML
62.5 SOLUTION INTRAVENOUS ONCE
Status: COMPLETED | OUTPATIENT
Start: 2021-08-03 | End: 2021-08-03

## 2021-08-03 RX ADMIN — ALBUMIN (HUMAN) 62.5 G: 5 SOLUTION INTRAVENOUS at 12:30

## 2021-08-03 NOTE — PROGRESS NOTES
OPIR Nursing Note      Paracentesis with Albumin Per Protocol done by Dr. Cottrell; Right access site; 9,900 mL of clear/yellow peritoneal fluid obtained and discarded.  62.5 g Albumin infused per protocol.      Spoke with Shweta SETHI at Dr. Oh's office; peritoneal fluid specimen sent for C&S with GS, cell count and albumin.    Pt tolerated the procedure well; pt hemodynamically stable pre/intra/post procedure; all questions and concerns answered prior to d/c; patient provided with all appropriate education for procedure; pt d/c home.

## 2021-08-04 LAB
GRAM STN SPEC: NORMAL
SIGNIFICANT IND 70042: NORMAL
SITE SITE: NORMAL
SOURCE SOURCE: NORMAL

## 2021-08-06 LAB
BACTERIA FLD AEROBE CULT: NORMAL
GRAM STN SPEC: NORMAL
SIGNIFICANT IND 70042: NORMAL
SITE SITE: NORMAL
SOURCE SOURCE: NORMAL

## 2021-09-21 ENCOUNTER — HOSPITAL ENCOUNTER (OUTPATIENT)
Dept: RADIOLOGY | Facility: MEDICAL CENTER | Age: 66
End: 2021-09-21
Attending: INTERNAL MEDICINE
Payer: COMMERCIAL

## 2021-09-21 DIAGNOSIS — K70.31 ALCOHOLIC CIRRHOSIS OF LIVER WITH ASCITES (HCC): ICD-10-CM

## 2021-09-21 PROCEDURE — C1729 CATH, DRAINAGE: HCPCS

## 2021-09-21 PROCEDURE — P9047 ALBUMIN (HUMAN), 25%, 50ML: HCPCS

## 2021-09-21 PROCEDURE — 700111 HCHG RX REV CODE 636 W/ 250 OVERRIDE (IP)

## 2021-09-21 RX ORDER — ALBUMIN (HUMAN) 12.5 G/50ML
75 SOLUTION INTRAVENOUS ONCE
Status: COMPLETED | OUTPATIENT
Start: 2021-09-21 | End: 2021-09-21

## 2021-09-21 RX ORDER — ALBUMIN (HUMAN) 12.5 G/50ML
SOLUTION INTRAVENOUS
Status: COMPLETED
Start: 2021-09-21 | End: 2021-09-21

## 2021-09-21 RX ORDER — ALBUMIN (HUMAN) 12.5 G/50ML
SOLUTION INTRAVENOUS
Status: DISCONTINUED
Start: 2021-09-21 | End: 2021-09-22 | Stop reason: HOSPADM

## 2021-09-21 RX ADMIN — ALBUMIN (HUMAN) 75 G: 5 SOLUTION INTRAVENOUS at 12:00

## 2021-09-21 RX ADMIN — ALBUMIN (HUMAN) 75 G: 12.5 SOLUTION INTRAVENOUS at 12:00

## 2021-09-22 NOTE — PROGRESS NOTES
"Patient given [facility] \"Preventing the Spread of Infection\" brochure upon arrival.     US guided therapeutic paracentesis done by Dr. Cottrell;(no H&P required as this is a NON SEDATION procedure) RLQ access site; [id number] obtained and discarded; pt tolerated the procedure well; pt hemodynamically stable pre/intra/post procedure; all questions and concerns answered prior to d/c; patient provided with all appropriate education for procedure; pt d/c home.       6 luz Albumin  "

## 2021-09-24 ENCOUNTER — HOSPITAL ENCOUNTER (INPATIENT)
Facility: MEDICAL CENTER | Age: 66
LOS: 2 days | DRG: 389 | End: 2021-09-26
Attending: EMERGENCY MEDICINE | Admitting: INTERNAL MEDICINE
Payer: COMMERCIAL

## 2021-09-24 ENCOUNTER — APPOINTMENT (OUTPATIENT)
Dept: RADIOLOGY | Facility: MEDICAL CENTER | Age: 66
DRG: 389 | End: 2021-09-24
Attending: EMERGENCY MEDICINE
Payer: COMMERCIAL

## 2021-09-24 ENCOUNTER — APPOINTMENT (OUTPATIENT)
Dept: RADIOLOGY | Facility: MEDICAL CENTER | Age: 66
DRG: 389 | End: 2021-09-24
Attending: INTERNAL MEDICINE
Payer: COMMERCIAL

## 2021-09-24 DIAGNOSIS — E87.20 LACTIC ACIDOSIS: ICD-10-CM

## 2021-09-24 DIAGNOSIS — D73.5 SPLENIC INFARCT: ICD-10-CM

## 2021-09-24 DIAGNOSIS — E72.20 HYPERAMMONEMIA (HCC): ICD-10-CM

## 2021-09-24 DIAGNOSIS — K76.82 HEPATIC ENCEPHALOPATHY (HCC): ICD-10-CM

## 2021-09-24 DIAGNOSIS — R41.82 ALTERED MENTAL STATUS, UNSPECIFIED ALTERED MENTAL STATUS TYPE: ICD-10-CM

## 2021-09-24 DIAGNOSIS — K56.600 PARTIAL SMALL BOWEL OBSTRUCTION (HCC): ICD-10-CM

## 2021-09-24 PROBLEM — R18.8 CIRRHOSIS OF LIVER WITH ASCITES (HCC): Status: ACTIVE | Noted: 2021-09-24

## 2021-09-24 PROBLEM — K56.609 SBO (SMALL BOWEL OBSTRUCTION) (HCC): Status: ACTIVE | Noted: 2021-09-24

## 2021-09-24 PROBLEM — R74.8 ELEVATED LIVER ENZYMES: Status: ACTIVE | Noted: 2021-09-24

## 2021-09-24 PROBLEM — K74.60 CIRRHOSIS OF LIVER WITH ASCITES (HCC): Status: ACTIVE | Noted: 2021-09-24

## 2021-09-24 PROBLEM — G93.40 ENCEPHALOPATHY ACUTE: Status: ACTIVE | Noted: 2021-09-24

## 2021-09-24 PROBLEM — R10.84 GENERALIZED ABDOMINAL PAIN: Status: ACTIVE | Noted: 2021-09-24

## 2021-09-24 LAB
ALBUMIN SERPL BCP-MCNC: 2.6 G/DL (ref 3.2–4.9)
ALBUMIN/GLOB SERPL: 0.6 G/DL
ALP SERPL-CCNC: 168 U/L (ref 30–99)
ALT SERPL-CCNC: 123 U/L (ref 2–50)
AMMONIA PLAS-SCNC: 72 UMOL/L (ref 11–45)
ANION GAP SERPL CALC-SCNC: 10 MMOL/L (ref 7–16)
ANISOCYTOSIS BLD QL SMEAR: ABNORMAL
APPEARANCE UR: CLEAR
APTT PPP: 36.2 SEC (ref 24.7–36)
AST SERPL-CCNC: 163 U/L (ref 12–45)
BASOPHILS # BLD AUTO: 0.2 % (ref 0–1.8)
BASOPHILS # BLD: 0.01 K/UL (ref 0–0.12)
BILIRUB SERPL-MCNC: 6.3 MG/DL (ref 0.1–1.5)
BILIRUB UR QL STRIP.AUTO: ABNORMAL
BUN SERPL-MCNC: 44 MG/DL (ref 8–22)
CALCIUM SERPL-MCNC: 8.7 MG/DL (ref 8.5–10.5)
CFT BLD TEG: 4.4 MIN (ref 4.6–9.1)
CFT P HPASE BLD TEG: 4.2 MIN (ref 4.3–8.3)
CHLORIDE SERPL-SCNC: 103 MMOL/L (ref 96–112)
CLOT ANGLE BLD TEG: 75.6 DEGREES (ref 63–78)
CLOT LYSIS 30M P MA LENFR BLD TEG: 1.2 % (ref 0–2.6)
CO2 SERPL-SCNC: 18 MMOL/L (ref 20–33)
COLOR UR: ABNORMAL
COMMENT 1642: NORMAL
CREAT SERPL-MCNC: 0.88 MG/DL (ref 0.5–1.4)
CT.EXTRINSIC BLD ROTEM: 1.2 MIN (ref 0.8–2.1)
EOSINOPHIL # BLD AUTO: 0 K/UL (ref 0–0.51)
EOSINOPHIL NFR BLD: 0 % (ref 0–6.9)
ERYTHROCYTE [DISTWIDTH] IN BLOOD BY AUTOMATED COUNT: 66 FL (ref 35.9–50)
GLOBULIN SER CALC-MCNC: 4.6 G/DL (ref 1.9–3.5)
GLUCOSE SERPL-MCNC: 67 MG/DL (ref 65–99)
GLUCOSE UR STRIP.AUTO-MCNC: NEGATIVE MG/DL
HCT VFR BLD AUTO: 36.3 % (ref 42–52)
HGB BLD-MCNC: 12.7 G/DL (ref 14–18)
IMM GRANULOCYTES # BLD AUTO: 0.03 K/UL (ref 0–0.11)
IMM GRANULOCYTES NFR BLD AUTO: 0.6 % (ref 0–0.9)
INR PPP: 2.51 (ref 0.87–1.13)
KETONES UR STRIP.AUTO-MCNC: NEGATIVE MG/DL
LACTATE BLD-SCNC: 2.8 MMOL/L (ref 0.5–2)
LACTATE BLD-SCNC: 2.9 MMOL/L (ref 0.5–2)
LACTATE BLD-SCNC: 3.8 MMOL/L (ref 0.5–2)
LEUKOCYTE ESTERASE UR QL STRIP.AUTO: NEGATIVE
LIPASE SERPL-CCNC: 39 U/L (ref 11–82)
LYMPHOCYTES # BLD AUTO: 1.02 K/UL (ref 1–4.8)
LYMPHOCYTES NFR BLD: 20 % (ref 22–41)
MACROCYTES BLD QL SMEAR: ABNORMAL
MCF BLD TEG: 52.7 MM (ref 52–69)
MCF.PLATELET INHIB BLD ROTEM: 18.9 MM (ref 15–32)
MCH RBC QN AUTO: 36.5 PG (ref 27–33)
MCHC RBC AUTO-ENTMCNC: 35 G/DL (ref 33.7–35.3)
MCV RBC AUTO: 104.3 FL (ref 81.4–97.8)
MICRO URNS: ABNORMAL
MONOCYTES # BLD AUTO: 0.77 K/UL (ref 0–0.85)
MONOCYTES NFR BLD AUTO: 15.1 % (ref 0–13.4)
MORPHOLOGY BLD-IMP: NORMAL
NEUTROPHILS # BLD AUTO: 3.26 K/UL (ref 1.82–7.42)
NEUTROPHILS NFR BLD: 64.1 % (ref 44–72)
NITRITE UR QL STRIP.AUTO: NEGATIVE
NRBC # BLD AUTO: 0 K/UL
NRBC BLD-RTO: 0 /100 WBC
PA AA BLD-ACNC: 8 % (ref 0–11)
PA ADP BLD-ACNC: 0 % (ref 0–17)
PH UR STRIP.AUTO: 6.5 [PH] (ref 5–8)
PLATELET # BLD AUTO: 118 K/UL (ref 164–446)
PLATELET BLD QL SMEAR: NORMAL
PMV BLD AUTO: 10 FL (ref 9–12.9)
POTASSIUM SERPL-SCNC: 5.4 MMOL/L (ref 3.6–5.5)
PROT SERPL-MCNC: 7.2 G/DL (ref 6–8.2)
PROT UR QL STRIP: NEGATIVE MG/DL
PROTHROMBIN TIME: 26.3 SEC (ref 12–14.6)
RBC # BLD AUTO: 3.48 M/UL (ref 4.7–6.1)
RBC BLD AUTO: PRESENT
RBC UR QL AUTO: NEGATIVE
SODIUM SERPL-SCNC: 131 MMOL/L (ref 135–145)
SP GR UR STRIP.AUTO: 1.04
TEG ALGORITHM TGALG: ABNORMAL
TROPONIN T SERPL-MCNC: 14 NG/L (ref 6–19)
UROBILINOGEN UR STRIP.AUTO-MCNC: 1 MG/DL
WBC # BLD AUTO: 5.1 K/UL (ref 4.8–10.8)

## 2021-09-24 PROCEDURE — 85384 FIBRINOGEN ACTIVITY: CPT

## 2021-09-24 PROCEDURE — 71045 X-RAY EXAM CHEST 1 VIEW: CPT

## 2021-09-24 PROCEDURE — 87040 BLOOD CULTURE FOR BACTERIA: CPT

## 2021-09-24 PROCEDURE — 80053 COMPREHEN METABOLIC PANEL: CPT

## 2021-09-24 PROCEDURE — 85610 PROTHROMBIN TIME: CPT

## 2021-09-24 PROCEDURE — 81003 URINALYSIS AUTO W/O SCOPE: CPT

## 2021-09-24 PROCEDURE — 85730 THROMBOPLASTIN TIME PARTIAL: CPT

## 2021-09-24 PROCEDURE — 85347 COAGULATION TIME ACTIVATED: CPT

## 2021-09-24 PROCEDURE — 85576 BLOOD PLATELET AGGREGATION: CPT | Mod: 91

## 2021-09-24 PROCEDURE — 99221 1ST HOSP IP/OBS SF/LOW 40: CPT | Performed by: SURGERY

## 2021-09-24 PROCEDURE — 700105 HCHG RX REV CODE 258: Performed by: INTERNAL MEDICINE

## 2021-09-24 PROCEDURE — 99285 EMERGENCY DEPT VISIT HI MDM: CPT

## 2021-09-24 PROCEDURE — 700117 HCHG RX CONTRAST REV CODE 255: Performed by: EMERGENCY MEDICINE

## 2021-09-24 PROCEDURE — 74177 CT ABD & PELVIS W/CONTRAST: CPT

## 2021-09-24 PROCEDURE — 85025 COMPLETE CBC W/AUTO DIFF WBC: CPT

## 2021-09-24 PROCEDURE — 99223 1ST HOSP IP/OBS HIGH 75: CPT | Performed by: INTERNAL MEDICINE

## 2021-09-24 PROCEDURE — 70450 CT HEAD/BRAIN W/O DYE: CPT

## 2021-09-24 PROCEDURE — 84484 ASSAY OF TROPONIN QUANT: CPT

## 2021-09-24 PROCEDURE — 700111 HCHG RX REV CODE 636 W/ 250 OVERRIDE (IP): Performed by: EMERGENCY MEDICINE

## 2021-09-24 PROCEDURE — 96374 THER/PROPH/DIAG INJ IV PUSH: CPT

## 2021-09-24 PROCEDURE — 83690 ASSAY OF LIPASE: CPT

## 2021-09-24 PROCEDURE — 770001 HCHG ROOM/CARE - MED/SURG/GYN PRIV*

## 2021-09-24 PROCEDURE — 36415 COLL VENOUS BLD VENIPUNCTURE: CPT

## 2021-09-24 PROCEDURE — 82140 ASSAY OF AMMONIA: CPT

## 2021-09-24 PROCEDURE — 700105 HCHG RX REV CODE 258: Performed by: EMERGENCY MEDICINE

## 2021-09-24 PROCEDURE — 83605 ASSAY OF LACTIC ACID: CPT

## 2021-09-24 RX ORDER — PROCHLORPERAZINE EDISYLATE 5 MG/ML
5-10 INJECTION INTRAMUSCULAR; INTRAVENOUS EVERY 4 HOURS PRN
Status: DISCONTINUED | OUTPATIENT
Start: 2021-09-24 | End: 2021-09-26 | Stop reason: HOSPADM

## 2021-09-24 RX ORDER — CEFTRIAXONE 2 G/1
2 INJECTION, POWDER, FOR SOLUTION INTRAMUSCULAR; INTRAVENOUS ONCE
Status: COMPLETED | OUTPATIENT
Start: 2021-09-24 | End: 2021-09-24

## 2021-09-24 RX ORDER — SPIRONOLACTONE 25 MG/1
25 TABLET ORAL DAILY
COMMUNITY
Start: 2021-08-25

## 2021-09-24 RX ORDER — SODIUM CHLORIDE 9 MG/ML
INJECTION, SOLUTION INTRAVENOUS CONTINUOUS
Status: DISCONTINUED | OUTPATIENT
Start: 2021-09-24 | End: 2021-09-25

## 2021-09-24 RX ORDER — ENALAPRILAT 1.25 MG/ML
1.25 INJECTION INTRAVENOUS EVERY 6 HOURS PRN
Status: DISCONTINUED | OUTPATIENT
Start: 2021-09-24 | End: 2021-09-26 | Stop reason: HOSPADM

## 2021-09-24 RX ORDER — OMEPRAZOLE 20 MG/1
40 CAPSULE, DELAYED RELEASE ORAL DAILY
Status: DISCONTINUED | OUTPATIENT
Start: 2021-09-24 | End: 2021-09-26 | Stop reason: HOSPADM

## 2021-09-24 RX ORDER — CLONIDINE HYDROCHLORIDE 0.1 MG/1
0.1 TABLET ORAL EVERY 6 HOURS PRN
Status: DISCONTINUED | OUTPATIENT
Start: 2021-09-24 | End: 2021-09-26 | Stop reason: HOSPADM

## 2021-09-24 RX ORDER — ACETAMINOPHEN 325 MG/1
650 TABLET ORAL EVERY 6 HOURS PRN
Status: DISCONTINUED | OUTPATIENT
Start: 2021-09-24 | End: 2021-09-26 | Stop reason: HOSPADM

## 2021-09-24 RX ORDER — PROMETHAZINE HYDROCHLORIDE 25 MG/1
12.5-25 SUPPOSITORY RECTAL EVERY 4 HOURS PRN
Status: DISCONTINUED | OUTPATIENT
Start: 2021-09-24 | End: 2021-09-26 | Stop reason: HOSPADM

## 2021-09-24 RX ORDER — POLYETHYLENE GLYCOL 3350 17 G/17G
1 POWDER, FOR SOLUTION ORAL
Status: DISCONTINUED | OUTPATIENT
Start: 2021-09-24 | End: 2021-09-26 | Stop reason: HOSPADM

## 2021-09-24 RX ORDER — FUROSEMIDE 40 MG/1
20 TABLET ORAL DAILY
Status: DISCONTINUED | OUTPATIENT
Start: 2021-09-24 | End: 2021-09-24

## 2021-09-24 RX ORDER — ONDANSETRON 4 MG/1
4 TABLET, ORALLY DISINTEGRATING ORAL EVERY 4 HOURS PRN
Status: DISCONTINUED | OUTPATIENT
Start: 2021-09-24 | End: 2021-09-26 | Stop reason: HOSPADM

## 2021-09-24 RX ORDER — FUROSEMIDE 20 MG/1
20 TABLET ORAL DAILY
COMMUNITY
Start: 2021-08-25

## 2021-09-24 RX ORDER — BISACODYL 10 MG
10 SUPPOSITORY, RECTAL RECTAL
Status: DISCONTINUED | OUTPATIENT
Start: 2021-09-24 | End: 2021-09-26 | Stop reason: HOSPADM

## 2021-09-24 RX ORDER — LACTULOSE 20 G/30ML
30 SOLUTION ORAL 3 TIMES DAILY
Status: DISCONTINUED | OUTPATIENT
Start: 2021-09-24 | End: 2021-09-26 | Stop reason: HOSPADM

## 2021-09-24 RX ORDER — LABETALOL HYDROCHLORIDE 5 MG/ML
10 INJECTION, SOLUTION INTRAVENOUS EVERY 4 HOURS PRN
Status: DISCONTINUED | OUTPATIENT
Start: 2021-09-24 | End: 2021-09-26 | Stop reason: HOSPADM

## 2021-09-24 RX ORDER — ONDANSETRON 2 MG/ML
4 INJECTION INTRAMUSCULAR; INTRAVENOUS EVERY 4 HOURS PRN
Status: DISCONTINUED | OUTPATIENT
Start: 2021-09-24 | End: 2021-09-26 | Stop reason: HOSPADM

## 2021-09-24 RX ORDER — AMOXICILLIN 250 MG
2 CAPSULE ORAL 2 TIMES DAILY
Status: DISCONTINUED | OUTPATIENT
Start: 2021-09-25 | End: 2021-09-26 | Stop reason: HOSPADM

## 2021-09-24 RX ORDER — SODIUM CHLORIDE, SODIUM LACTATE, POTASSIUM CHLORIDE, AND CALCIUM CHLORIDE .6; .31; .03; .02 G/100ML; G/100ML; G/100ML; G/100ML
30 INJECTION, SOLUTION INTRAVENOUS ONCE
Status: COMPLETED | OUTPATIENT
Start: 2021-09-24 | End: 2021-09-24

## 2021-09-24 RX ORDER — OMEPRAZOLE 40 MG/1
40 CAPSULE, DELAYED RELEASE ORAL DAILY
COMMUNITY
Start: 2021-08-25

## 2021-09-24 RX ORDER — SPIRONOLACTONE 25 MG/1
25 TABLET ORAL DAILY
Status: DISCONTINUED | OUTPATIENT
Start: 2021-09-24 | End: 2021-09-24

## 2021-09-24 RX ORDER — PROMETHAZINE HYDROCHLORIDE 25 MG/1
12.5-25 TABLET ORAL EVERY 4 HOURS PRN
Status: DISCONTINUED | OUTPATIENT
Start: 2021-09-24 | End: 2021-09-26 | Stop reason: HOSPADM

## 2021-09-24 RX ADMIN — CEFTRIAXONE SODIUM 2 G: 2 INJECTION, POWDER, FOR SOLUTION INTRAMUSCULAR; INTRAVENOUS at 16:02

## 2021-09-24 RX ADMIN — SODIUM CHLORIDE: 9 INJECTION, SOLUTION INTRAVENOUS at 23:37

## 2021-09-24 RX ADMIN — SODIUM CHLORIDE, POTASSIUM CHLORIDE, SODIUM LACTATE AND CALCIUM CHLORIDE 2859 ML: 600; 310; 30; 20 INJECTION, SOLUTION INTRAVENOUS at 13:18

## 2021-09-24 RX ADMIN — IOHEXOL 100 ML: 350 INJECTION, SOLUTION INTRAVENOUS at 14:45

## 2021-09-24 RX ADMIN — SODIUM CHLORIDE: 9 INJECTION, SOLUTION INTRAVENOUS at 18:46

## 2021-09-24 ASSESSMENT — COGNITIVE AND FUNCTIONAL STATUS - GENERAL
CLIMB 3 TO 5 STEPS WITH RAILING: A LOT
WALKING IN HOSPITAL ROOM: A LOT
MOBILITY SCORE: 16
STANDING UP FROM CHAIR USING ARMS: A LOT
SUGGESTED CMS G CODE MODIFIER DAILY ACTIVITY: CL
DRESSING REGULAR LOWER BODY CLOTHING: A LOT
SUGGESTED CMS G CODE MODIFIER MOBILITY: CK
HELP NEEDED FOR BATHING: A LOT
EATING MEALS: A LITTLE
TOILETING: A LOT
MOVING FROM LYING ON BACK TO SITTING ON SIDE OF FLAT BED: A LITTLE
DRESSING REGULAR UPPER BODY CLOTHING: A LOT
MOVING TO AND FROM BED TO CHAIR: A LITTLE
PERSONAL GROOMING: A LOT
DAILY ACTIVITIY SCORE: 13

## 2021-09-24 ASSESSMENT — PATIENT HEALTH QUESTIONNAIRE - PHQ9
SUM OF ALL RESPONSES TO PHQ9 QUESTIONS 1 AND 2: 0
2. FEELING DOWN, DEPRESSED, IRRITABLE, OR HOPELESS: NOT AT ALL
1. LITTLE INTEREST OR PLEASURE IN DOING THINGS: NOT AT ALL

## 2021-09-24 ASSESSMENT — LIFESTYLE VARIABLES
EVER HAD A DRINK FIRST THING IN THE MORNING TO STEADY YOUR NERVES TO GET RID OF A HANGOVER: NO
CONSUMPTION TOTAL: NEGATIVE
TOTAL SCORE: 0
AVERAGE NUMBER OF DAYS PER WEEK YOU HAVE A DRINK CONTAINING ALCOHOL: 0
TOTAL SCORE: 0
ALCOHOL_USE: NO
ON A TYPICAL DAY WHEN YOU DRINK ALCOHOL HOW MANY DRINKS DO YOU HAVE: 0
EVER FELT BAD OR GUILTY ABOUT YOUR DRINKING: NO
TOTAL SCORE: 0
HAVE PEOPLE ANNOYED YOU BY CRITICIZING YOUR DRINKING: NO
HAVE YOU EVER FELT YOU SHOULD CUT DOWN ON YOUR DRINKING: NO
HOW MANY TIMES IN THE PAST YEAR HAVE YOU HAD 5 OR MORE DRINKS IN A DAY: 0

## 2021-09-24 ASSESSMENT — PAIN DESCRIPTION - PAIN TYPE: TYPE: ACUTE PAIN

## 2021-09-24 ASSESSMENT — FIBROSIS 4 INDEX: FIB4 SCORE: 4

## 2021-09-24 NOTE — ASSESSMENT & PLAN NOTE
Improving per patient   Likely related to SBP   I have started him on Ceftriaxone for empiric coverage of SBP   Plan as above
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
## 2021-09-24 NOTE — ASSESSMENT & PLAN NOTE
S/p paracentesis 9/25 with 1250mL removed    Ascitic fluid cultures in process, currently NGTD    Continue on ceftriaxone  Follow CMP   Resume lasix and spironolactone with hold parameters   Monitor I&Os

## 2021-09-24 NOTE — ASSESSMENT & PLAN NOTE
Resolved   Mentation at baseline this morning   Continue lactulose TID, titrate to 3 BM per day   Monitor electrolytes
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
## 2021-09-25 ENCOUNTER — APPOINTMENT (OUTPATIENT)
Dept: RADIOLOGY | Facility: MEDICAL CENTER | Age: 66
DRG: 389 | End: 2021-09-25
Attending: INTERNAL MEDICINE
Payer: COMMERCIAL

## 2021-09-25 PROBLEM — R74.8 ELEVATED LIVER ENZYMES: Status: RESOLVED | Noted: 2021-09-24 | Resolved: 2021-09-25

## 2021-09-25 PROBLEM — K56.600 PARTIAL SMALL BOWEL OBSTRUCTION (HCC): Status: ACTIVE | Noted: 2021-09-24

## 2021-09-25 PROBLEM — D68.9 COAGULOPATHY (HCC): Status: ACTIVE | Noted: 2021-09-25

## 2021-09-25 PROBLEM — K76.82 HEPATIC ENCEPHALOPATHY (HCC): Status: ACTIVE | Noted: 2021-09-24

## 2021-09-25 PROBLEM — G51.0 BELL'S PALSY: Status: ACTIVE | Noted: 2021-09-25

## 2021-09-25 PROBLEM — D69.6 THROMBOCYTOPENIA (HCC): Status: ACTIVE | Noted: 2021-09-25

## 2021-09-25 LAB
ALBUMIN SERPL BCP-MCNC: 2.5 G/DL (ref 3.2–4.9)
ALBUMIN/GLOB SERPL: 0.6 G/DL
ALP SERPL-CCNC: 159 U/L (ref 30–99)
ALT SERPL-CCNC: 111 U/L (ref 2–50)
ANION GAP SERPL CALC-SCNC: 10 MMOL/L (ref 7–16)
APPEARANCE FLD: NORMAL
AST SERPL-CCNC: 138 U/L (ref 12–45)
BILIRUB SERPL-MCNC: 6.3 MG/DL (ref 0.1–1.5)
BODY FLD TYPE: NORMAL
BODY FLD TYPE: NORMAL
BUN SERPL-MCNC: 28 MG/DL (ref 8–22)
CALCIUM SERPL-MCNC: 8.4 MG/DL (ref 8.5–10.5)
CHLORIDE SERPL-SCNC: 104 MMOL/L (ref 96–112)
CO2 SERPL-SCNC: 19 MMOL/L (ref 20–33)
COLOR FLD: YELLOW
CREAT SERPL-MCNC: 0.78 MG/DL (ref 0.5–1.4)
CSF COMMENTS 1658: NORMAL
ERYTHROCYTE [DISTWIDTH] IN BLOOD BY AUTOMATED COUNT: 66.1 FL (ref 35.9–50)
GLOBULIN SER CALC-MCNC: 4.3 G/DL (ref 1.9–3.5)
GLUCOSE SERPL-MCNC: 59 MG/DL (ref 65–99)
HCT VFR BLD AUTO: 32.9 % (ref 42–52)
HGB BLD-MCNC: 11.7 G/DL (ref 14–18)
LACTATE BLD-SCNC: 2.6 MMOL/L (ref 0.5–2)
LYMPHOCYTES NFR FLD: 12 %
MCH RBC QN AUTO: 37.5 PG (ref 27–33)
MCHC RBC AUTO-ENTMCNC: 35.6 G/DL (ref 33.7–35.3)
MCV RBC AUTO: 105.4 FL (ref 81.4–97.8)
MONONUC CELLS NFR FLD: 23 %
NEUTROPHILS NFR FLD: 65 %
PLATELET # BLD AUTO: 121 K/UL (ref 164–446)
PMV BLD AUTO: 10 FL (ref 9–12.9)
POTASSIUM SERPL-SCNC: 5 MMOL/L (ref 3.6–5.5)
PROT FLD-MCNC: 1.5 G/DL
PROT SERPL-MCNC: 6.8 G/DL (ref 6–8.2)
RBC # BLD AUTO: 3.12 M/UL (ref 4.7–6.1)
RBC # FLD: 6000 CELLS/UL
SODIUM SERPL-SCNC: 133 MMOL/L (ref 135–145)
WBC # BLD AUTO: 4.1 K/UL (ref 4.8–10.8)
WBC # FLD: 4537 CELLS/UL

## 2021-09-25 PROCEDURE — 36415 COLL VENOUS BLD VENIPUNCTURE: CPT

## 2021-09-25 PROCEDURE — 85027 COMPLETE CBC AUTOMATED: CPT

## 2021-09-25 PROCEDURE — 700102 HCHG RX REV CODE 250 W/ 637 OVERRIDE(OP): Performed by: INTERNAL MEDICINE

## 2021-09-25 PROCEDURE — 84157 ASSAY OF PROTEIN OTHER: CPT

## 2021-09-25 PROCEDURE — 99233 SBSQ HOSP IP/OBS HIGH 50: CPT | Performed by: NURSE PRACTITIONER

## 2021-09-25 PROCEDURE — 87205 SMEAR GRAM STAIN: CPT

## 2021-09-25 PROCEDURE — 76705 ECHO EXAM OF ABDOMEN: CPT

## 2021-09-25 PROCEDURE — 770001 HCHG ROOM/CARE - MED/SURG/GYN PRIV*

## 2021-09-25 PROCEDURE — 87015 SPECIMEN INFECT AGNT CONCNTJ: CPT

## 2021-09-25 PROCEDURE — 83605 ASSAY OF LACTIC ACID: CPT

## 2021-09-25 PROCEDURE — 0W9G3ZZ DRAINAGE OF PERITONEAL CAVITY, PERCUTANEOUS APPROACH: ICD-10-PCS | Performed by: RADIOLOGY

## 2021-09-25 PROCEDURE — 87186 SC STD MICRODIL/AGAR DIL: CPT

## 2021-09-25 PROCEDURE — C1729 CATH, DRAINAGE: HCPCS

## 2021-09-25 PROCEDURE — 87070 CULTURE OTHR SPECIMN AEROBIC: CPT

## 2021-09-25 PROCEDURE — A9270 NON-COVERED ITEM OR SERVICE: HCPCS | Performed by: INTERNAL MEDICINE

## 2021-09-25 PROCEDURE — 700105 HCHG RX REV CODE 258: Performed by: NURSE PRACTITIONER

## 2021-09-25 PROCEDURE — 80053 COMPREHEN METABOLIC PANEL: CPT

## 2021-09-25 PROCEDURE — 99231 SBSQ HOSP IP/OBS SF/LOW 25: CPT | Performed by: SURGERY

## 2021-09-25 PROCEDURE — 89051 BODY FLUID CELL COUNT: CPT

## 2021-09-25 PROCEDURE — 87077 CULTURE AEROBIC IDENTIFY: CPT

## 2021-09-25 PROCEDURE — 700111 HCHG RX REV CODE 636 W/ 250 OVERRIDE (IP): Performed by: NURSE PRACTITIONER

## 2021-09-25 RX ORDER — FUROSEMIDE 20 MG/1
20 TABLET ORAL DAILY
Status: DISCONTINUED | OUTPATIENT
Start: 2021-09-25 | End: 2021-09-26 | Stop reason: HOSPADM

## 2021-09-25 RX ORDER — SPIRONOLACTONE 50 MG/1
25 TABLET, FILM COATED ORAL DAILY
Status: DISCONTINUED | OUTPATIENT
Start: 2021-09-25 | End: 2021-09-26 | Stop reason: HOSPADM

## 2021-09-25 RX ADMIN — LACTULOSE 30 ML: 20 SOLUTION ORAL at 18:22

## 2021-09-25 RX ADMIN — LACTULOSE 30 ML: 20 SOLUTION ORAL at 14:03

## 2021-09-25 RX ADMIN — DOCUSATE SODIUM 50 MG AND SENNOSIDES 8.6 MG 2 TABLET: 8.6; 5 TABLET, FILM COATED ORAL at 18:22

## 2021-09-25 RX ADMIN — ACETAMINOPHEN 650 MG: 325 TABLET, FILM COATED ORAL at 14:04

## 2021-09-25 RX ADMIN — CEFTRIAXONE SODIUM 2 G: 2 INJECTION, POWDER, FOR SOLUTION INTRAMUSCULAR; INTRAVENOUS at 12:01

## 2021-09-25 ASSESSMENT — ENCOUNTER SYMPTOMS
VOMITING: 0
BLOOD IN STOOL: 0
SHORTNESS OF BREATH: 1
FEVER: 1
WEAKNESS: 1
SORE THROAT: 0
HEARTBURN: 0
COUGH: 0
WEIGHT LOSS: 0
CHILLS: 0
DIARRHEA: 1
DIZZINESS: 0
MEMORY LOSS: 1
STRIDOR: 0
HEADACHES: 0
PALPITATIONS: 0
ABDOMINAL PAIN: 1
NAUSEA: 1
WHEEZING: 0

## 2021-09-25 ASSESSMENT — PAIN DESCRIPTION - PAIN TYPE
TYPE: ACUTE PAIN

## 2021-09-25 NOTE — PROGRESS NOTES
Hospital Medicine Daily Progress Note    Date of Service  9/25/2021    Chief Complaint  Crispin Summers is a 65 y.o. male admitted 9/24/2021 with AMS.     Hospital Course  Crispin Summers is a 65 y.o. male with PMH of cirrhosis of liver, HTN who presented 9/24/2021 with altered mental status x3 days. He had paracentesis done this past Tuesday and removed 11 L of ascites fluid and since then he has been complaining about abdominal pain and confusion.  He never had similar symptoms before.  Since yesterday he was having nausea and vomiting. He follows with Dr. Oh from GI on a regular basis and he has his paracentesis done once every 3 weeks.    In the ER, he had CT scan done and showed SBO vs ileus. Dr. Gant was consulted.    Interval Problem Update  9/25: Patient alert and oriented to self, hospital and situation. Disoriented to time. Mild asterixis. He reports last BM yesterday. + Flatus today. Reports n/v resolved. Currently, NPO pending paracentesis. LA trending down. IVF stopped as he does appear volume overloaded. On empiric ceftriaxone.     I have personally seen and examined the patient at bedside. I discussed the plan of care with patient and family.    Consultants/Specialty  general surgery    Code Status  Full Code    Disposition  Patient is not medically cleared.   Anticipate discharge to to home with close outpatient follow-up.  I have placed the appropriate orders for post-discharge needs.    Review of Systems  Review of Systems   Constitutional: Positive for fever and malaise/fatigue. Negative for chills and weight loss.   HENT: Negative for sore throat.    Respiratory: Positive for shortness of breath. Negative for cough, wheezing and stridor.    Cardiovascular: Negative for chest pain and palpitations.   Gastrointestinal: Positive for abdominal pain, diarrhea and nausea. Negative for blood in stool, heartburn, melena and vomiting.   Genitourinary: Negative for dysuria,  frequency and urgency.   Skin: Negative for itching and rash.   Neurological: Positive for weakness. Negative for dizziness and headaches.   Psychiatric/Behavioral: Positive for memory loss.        Physical Exam  Temp:  [36.3 °C (97.3 °F)-37.3 °C (99.2 °F)] 36.3 °C (97.4 °F)  Pulse:  [82-97] 86  Resp:  [16-44] 16  BP: ()/(52-69) 99/54  SpO2:  [92 %-96 %] 93 %    Physical Exam  Vitals and nursing note reviewed. Exam conducted with a chaperone present (wife at bedside).   Constitutional:       General: He is not in acute distress.     Appearance: He is ill-appearing.   HENT:      Head: Normocephalic.      Right Ear: External ear normal.      Left Ear: External ear normal.      Nose: No congestion or rhinorrhea.      Mouth/Throat:      Mouth: Mucous membranes are moist.   Eyes:      General: Scleral icterus present.         Right eye: No discharge.         Left eye: No discharge.      Pupils: Pupils are equal, round, and reactive to light.   Cardiovascular:      Rate and Rhythm: Normal rate.      Pulses: Normal pulses.   Pulmonary:      Effort: Pulmonary effort is normal. No respiratory distress.      Breath sounds: No stridor. No wheezing or rhonchi.   Abdominal:      General: Bowel sounds are increased. There is distension.      Palpations: There is fluid wave.      Tenderness: There is generalized abdominal tenderness. There is no right CVA tenderness, left CVA tenderness or rebound.   Musculoskeletal:         General: No signs of injury.      Cervical back: No rigidity or tenderness.      Right lower le+ Edema present.      Left lower le+ Edema present.   Skin:     Coloration: Skin is jaundiced.   Neurological:      Mental Status: He is disoriented.      GCS: GCS eye subscore is 4. GCS verbal subscore is 4. GCS motor subscore is 6.      Cranial Nerves: Facial asymmetry present. No dysarthria.      Comments: + asterixis   Left-sided facial weakness/ ptosis secondary to Bell's palsy (at baseline per  patient)     Psychiatric:         Mood and Affect: Mood normal.         Speech: Speech is delayed.         Behavior: Behavior is slowed. Behavior is cooperative.         Fluids    Intake/Output Summary (Last 24 hours) at 9/25/2021 1018  Last data filed at 9/25/2021 0458  Gross per 24 hour   Intake 461.67 ml   Output 0 ml   Net 461.67 ml       Laboratory  Recent Labs     09/24/21  1228 09/25/21  0404   WBC 5.1 4.1*   RBC 3.48* 3.12*   HEMOGLOBIN 12.7* 11.7*   HEMATOCRIT 36.3* 32.9*   .3* 105.4*   MCH 36.5* 37.5*   MCHC 35.0 35.6*   RDW 66.0* 66.1*   PLATELETCT 118* 121*   MPV 10.0 10.0     Recent Labs     09/24/21  1228 09/25/21  0404   SODIUM 131* 133*   POTASSIUM 5.4 5.0   CHLORIDE 103 104   CO2 18* 19*   GLUCOSE 67 59*   BUN 44* 28*   CREATININE 0.88 0.78   CALCIUM 8.7 8.4*     Recent Labs     09/24/21  1228   APTT 36.2*   INR 2.51*               Imaging  CT-ABDOMEN-PELVIS WITH   Final Result      1.  Partial small bowel obstruction versus ileus. Possible transition point in the left abdomen.   2.  Morphologic changes of chronic liver disease with stigmata of portal hypertension including large volume ascites, splenomegaly and splenic vein varices.   3.  Peripheral wedge-shaped hypodensities of the spleen likely representing infarct.   4.  Wall thickening of the gallbladder likely reactive. Consider further imaging evaluation as indicated.   5.  Prostatomegaly.      CT-HEAD W/O   Final Result      1.  No acute intracranial abnormality is identified.   2.  There are periventricular and subcortical white matter changes present.  This finding is nonspecific and could be from previous small vessel ischemia, demyelination, or gliosis.         DX-CHEST-PORTABLE (1 VIEW)   Final Result      1.  Hypoventilatory changes with bibasilar atelectasis.   2.  Small right pleural effusion.      US-RUQ    (Results Pending)   US-PARACENTESIS, ABD WITH IMAGING    (Results Pending)        Assessment/Plan  * Partial small bowel  obstruction (HCC)- (present on admission)  Assessment & Plan  NPO for now   Supportive care  Surgery consulted and following     Generalized abdominal pain  Assessment & Plan  Improving per patient   Likely related to SBP   I have started him on Ceftriaxone for empiric coverage of SBP   Plan as above    Cirrhosis of liver with ascites (HCC)- (present on admission)  Assessment & Plan  Last paracentesis done 9/21   Now with recurrent large volume ascites   Ultrasound abdomen and diagnostic paracentesis ordered , Ascitic fluid study ordered  Continue on ceftriaxone  Follow CMP   Holding lasix and spironolactone for now as he appeared dehydrated on admission and now NPO for procedure . Will resume when cleared for PO intake   Monitor I&Os   Fluid restriction      Hepatic encephalopathy (HCC)- (present on admission)  Assessment & Plan  Mentation improving   Ammonia elevated at 72 on admission   Mild asterixis on exam   Resume lactulose       Coagulopathy (HCC)- (present on admission)  Assessment & Plan  Secondary to cirrhosis   No active bleeding on exam   Monitor INR     Thrombocytopenia (HCC)- (present on admission)  Assessment & Plan  Secondary to cirrhosis   PLT at baseline   Monitor and watch for bleeding     Lactic acidosis- (present on admission)  Assessment & Plan  Improving   IVFs discontinued as he now appears volume overloaded   Monitor       Wright's palsy- (present on admission)  Assessment & Plan  Diagnosed in June 2020   Residual left-sided facial weakness        VTE prophylaxis: SCDs/TEDs    I have performed a physical exam and reviewed and updated ROS and Plan today (9/25/2021). In review of yesterday's note (9/24/2021), there are no changes except as documented above.

## 2021-09-25 NOTE — PROGRESS NOTES
AA&Ox2. Disoriented to time and situation.    SpO2 93% on RA. Denies SOB.    Reporting 6/10 pain. Medicated per MAR.    SKIN scattered bruising to chest, and bilateral upper and lower extremities. Heels are boggy. No other areas of concern.     Tolerating NPO diet. Denies N/V.    + void.    + BM / LBM PTA.    Pt ambulates/up with 1 person assist and a ffw.    All needs met at this time. Call light within reach. Pt calls appropriately.

## 2021-09-25 NOTE — PROGRESS NOTES
Dr. Salinas consented patient and performed Paracentesis   in U/S room 2  .   6150  ml of fluid removed,     1250 ml of fluid sent to lab .  Vitals monitored during procedure and documented in EPIC.  Patient tolerated procedure well.. Report given to TIFFANIE Palafox. Transport returned pt to his room.

## 2021-09-25 NOTE — PROGRESS NOTES
4 Eyes Skin Assessment Completed by Karina RN and Lucia MORENO.    Head WDL  Ears WDL  Nose WDL  Mouth WDL  Neck WDL  Breast/Chest Bruising  Shoulder Blades WDL  Spine WDL  (R) Arm/Elbow/Hand Bruising  (L) Arm/Elbow/Hand Bruising  Abdomen Bruising  Groin WDL  Scrotum/Coccyx/Buttocks WDL  (R) Leg Bruising  (L) Leg Bruising  (R) Heel/Foot/Toe Bruising, boggy  (L) Heel/Foot/Toe Boggy and Bruising      Devices In Places Pulse Ox    Interventions In Place Pillows, Barrier Cream, Heels Loaded W/Pillows and Pressure Redistribution Mattress    Possible Skin Injury No    Pictures Uploaded Into Epic N/A  Wound Consult Placed N/A  RN Wound Prevention Protocol Ordered No
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
## 2021-09-25 NOTE — CARE PLAN
The patient is Stable - Low risk of patient condition declining or worsening    Shift Goals  Clinical Goals: pain control  Patient Goals: rest  Family Goals: Rest    Progress made toward(s) clinical / shift goals:      Problem: Knowledge Deficit - Standard  Goal: Patient and family/care givers will demonstrate understanding of plan of care, disease process/condition, diagnostic tests and medications  Outcome: Progressing   Pt was educated on POC, MAR, shift routine and nursing education R/T Dx. Questions were encouraged and answered. Pt verbalized understanding of all teaching.        Problem: Pain - Standard  Goal: Alleviation of pain or a reduction in pain to the patient’s comfort goal  Outcome: Progressing   Pt was educated on 0-10 pain scale, non-pharm methods of pain relief and MAR. Pt demonstrates understanding by rating pain as a 6 on 0-10 pain scale. Pt states that their pain is well controlled and declines pain medication.     Patient is not progressing towards the following goals:

## 2021-09-25 NOTE — CARE PLAN
The patient is Watcher - Medium risk of patient condition declining or worsening    Shift Goals  Clinical Goals: Rest, safety  Patient Goals: Rest  Family Goals: Rest    Problem: Knowledge Deficit - Standard  Goal: Patient and family/care givers will demonstrate understanding of plan of care, disease process/condition, diagnostic tests and medications  Outcome: Progressing     Problem: Skin Integrity  Goal: Skin integrity is maintained or improved  Outcome: Progressing     Problem: Fall Risk  Goal: Patient will remain free from falls  Outcome: Progressing     Problem: Pain - Standard  Goal: Alleviation of pain or a reduction in pain to the patient’s comfort goal  Outcome: Progressing       Progress made toward(s) clinical / shift goals:      Patient complains of a mild headache. Plan of care is to have a paracentesis in the AM. Patient and family verbalize understanding. Patient does not attempt to get out of bed and is not impulsive. Patient is very lethargic at this time.     Patient is not progressing towards the following goals:

## 2021-09-25 NOTE — HOSPITAL COURSE
Crispin Summers is a 65 y.o. male with PMH of cirrhosis of liver, HTN who presented 9/24/2021 with altered mental status x3 days. He had paracentesis done this past Tuesday and removed 11 L of ascites fluid and since then he has been complaining about abdominal pain and confusion.  He never had similar symptoms before.  Since yesterday he was having nausea and vomiting. He follows with Dr. Oh from GI on a regular basis and he has his paracentesis done once every 3 weeks.    In the ER, he had CT scan done and showed SBO vs ileus. Dr. Gant was consulted.

## 2021-09-25 NOTE — PROGRESS NOTES
"Surgery    End-stage liver disease with possible ileus on admission CT yesterday  Patient passing flatus  Wants food    BP (!) 99/55   Pulse 83   Temp 36.3 °C (97.4 °F) (Temporal)   Resp 16   Ht 1.753 m (5' 9\")   Wt 95.3 kg (210 lb)   SpO2 93%   BMI 31.01 kg/m²     Abdomen is soft, nontender and nondistended  Normal active bowel sounds    Recent Labs     09/24/21  1228 09/25/21  0404   WBC 5.1 4.1*   RBC 3.48* 3.12*   HEMOGLOBIN 12.7* 11.7*   HEMATOCRIT 36.3* 32.9*   .3* 105.4*   MCH 36.5* 37.5*   RDW 66.0* 66.1*   PLATELETCT 118* 121*   MPV 10.0 10.0   NEUTSPOLYS 64.10  --    LYMPHOCYTES 20.00*  --    MONOCYTES 15.10*  --    EOSINOPHILS 0.00  --    BASOPHILS 0.20  --    RBCMORPHOLO Present  --      Assessment and plan:  Possible ileus related to SBP/ESLD, seems to be resolving    Start clear liquid diet and advance as tolerated    Surgery signing off, call us if there is any question or concern    "

## 2021-09-25 NOTE — PROGRESS NOTES
Patient arrived to the floor from the ER, in room 424-2, family at bedside. Patient A+Ox2, family reports intermittent confusion since arrival in the ER. Patient falls asleep frequently during sentences. Awakens to voice. On room air. Denies pain and nausea at this time. IVF infusing as ordered. Awaiting surgical consult. Bed alarm on, call bell in hand, whiteboard updated.

## 2021-09-26 VITALS
WEIGHT: 210 LBS | DIASTOLIC BLOOD PRESSURE: 69 MMHG | HEART RATE: 73 BPM | HEIGHT: 69 IN | TEMPERATURE: 98.5 F | BODY MASS INDEX: 31.1 KG/M2 | OXYGEN SATURATION: 95 % | SYSTOLIC BLOOD PRESSURE: 107 MMHG | RESPIRATION RATE: 18 BRPM

## 2021-09-26 PROBLEM — K76.82 HEPATIC ENCEPHALOPATHY (HCC): Status: RESOLVED | Noted: 2021-09-24 | Resolved: 2021-09-26

## 2021-09-26 PROBLEM — R10.84 GENERALIZED ABDOMINAL PAIN: Status: RESOLVED | Noted: 2021-09-24 | Resolved: 2021-09-26

## 2021-09-26 PROBLEM — K56.600 PARTIAL SMALL BOWEL OBSTRUCTION (HCC): Status: RESOLVED | Noted: 2021-09-24 | Resolved: 2021-09-26

## 2021-09-26 PROBLEM — K72.00 SUBACUTE LIVER FAILURE: Status: ACTIVE | Noted: 2021-09-26

## 2021-09-26 PROBLEM — E87.20 LACTIC ACIDOSIS: Status: RESOLVED | Noted: 2021-09-24 | Resolved: 2021-09-26

## 2021-09-26 LAB
ALBUMIN SERPL BCP-MCNC: 2.2 G/DL (ref 3.2–4.9)
ALBUMIN/GLOB SERPL: 0.5 G/DL
ALP SERPL-CCNC: 160 U/L (ref 30–99)
ALT SERPL-CCNC: 125 U/L (ref 2–50)
ANION GAP SERPL CALC-SCNC: 10 MMOL/L (ref 7–16)
AST SERPL-CCNC: 140 U/L (ref 12–45)
BILIRUB SERPL-MCNC: 5.6 MG/DL (ref 0.1–1.5)
BUN SERPL-MCNC: 44 MG/DL (ref 8–22)
CALCIUM SERPL-MCNC: 8.4 MG/DL (ref 8.5–10.5)
CHLORIDE SERPL-SCNC: 104 MMOL/L (ref 96–112)
CO2 SERPL-SCNC: 17 MMOL/L (ref 20–33)
CREAT SERPL-MCNC: 0.71 MG/DL (ref 0.5–1.4)
ERYTHROCYTE [DISTWIDTH] IN BLOOD BY AUTOMATED COUNT: 65.8 FL (ref 35.9–50)
GLOBULIN SER CALC-MCNC: 4.6 G/DL (ref 1.9–3.5)
GLUCOSE SERPL-MCNC: 91 MG/DL (ref 65–99)
GRAM STN SPEC: NORMAL
HCT VFR BLD AUTO: 33.7 % (ref 42–52)
HGB BLD-MCNC: 11.6 G/DL (ref 14–18)
INR PPP: 2.15 (ref 0.87–1.13)
MCH RBC QN AUTO: 36.4 PG (ref 27–33)
MCHC RBC AUTO-ENTMCNC: 34.4 G/DL (ref 33.7–35.3)
MCV RBC AUTO: 105.6 FL (ref 81.4–97.8)
PLATELET # BLD AUTO: 121 K/UL (ref 164–446)
PMV BLD AUTO: 9.7 FL (ref 9–12.9)
POTASSIUM SERPL-SCNC: 4.4 MMOL/L (ref 3.6–5.5)
PROT SERPL-MCNC: 6.8 G/DL (ref 6–8.2)
PROTHROMBIN TIME: 23.4 SEC (ref 12–14.6)
RBC # BLD AUTO: 3.19 M/UL (ref 4.7–6.1)
SIGNIFICANT IND 70042: NORMAL
SITE SITE: NORMAL
SODIUM SERPL-SCNC: 131 MMOL/L (ref 135–145)
SOURCE SOURCE: NORMAL
WBC # BLD AUTO: 3.6 K/UL (ref 4.8–10.8)

## 2021-09-26 PROCEDURE — 85027 COMPLETE CBC AUTOMATED: CPT

## 2021-09-26 PROCEDURE — 85610 PROTHROMBIN TIME: CPT

## 2021-09-26 PROCEDURE — A9270 NON-COVERED ITEM OR SERVICE: HCPCS | Performed by: INTERNAL MEDICINE

## 2021-09-26 PROCEDURE — 700102 HCHG RX REV CODE 250 W/ 637 OVERRIDE(OP): Performed by: INTERNAL MEDICINE

## 2021-09-26 PROCEDURE — 700111 HCHG RX REV CODE 636 W/ 250 OVERRIDE (IP): Performed by: NURSE PRACTITIONER

## 2021-09-26 PROCEDURE — 36415 COLL VENOUS BLD VENIPUNCTURE: CPT

## 2021-09-26 PROCEDURE — 700105 HCHG RX REV CODE 258: Performed by: NURSE PRACTITIONER

## 2021-09-26 PROCEDURE — 99231 SBSQ HOSP IP/OBS SF/LOW 25: CPT | Performed by: SURGERY

## 2021-09-26 PROCEDURE — 99239 HOSP IP/OBS DSCHRG MGMT >30: CPT | Performed by: NURSE PRACTITIONER

## 2021-09-26 PROCEDURE — 80053 COMPREHEN METABOLIC PANEL: CPT

## 2021-09-26 RX ORDER — CIPROFLOXACIN 500 MG/1
500 TABLET, FILM COATED ORAL 2 TIMES DAILY
Qty: 12 TABLET | Refills: 0 | Status: SHIPPED | OUTPATIENT
Start: 2021-09-26 | End: 2021-10-02

## 2021-09-26 RX ORDER — LACTULOSE 20 G/30ML
30 SOLUTION ORAL 3 TIMES DAILY
Qty: 450 ML | Refills: 0 | Status: SHIPPED | OUTPATIENT
Start: 2021-09-26

## 2021-09-26 RX ADMIN — OMEPRAZOLE 40 MG: 20 CAPSULE, DELAYED RELEASE ORAL at 06:05

## 2021-09-26 RX ADMIN — LACTULOSE 30 ML: 20 SOLUTION ORAL at 06:05

## 2021-09-26 RX ADMIN — CEFTRIAXONE SODIUM 2 G: 2 INJECTION, POWDER, FOR SOLUTION INTRAMUSCULAR; INTRAVENOUS at 06:04

## 2021-09-26 RX ADMIN — LACTULOSE 30 ML: 20 SOLUTION ORAL at 11:16

## 2021-09-26 ASSESSMENT — ENCOUNTER SYMPTOMS
NAUSEA: 0
ABDOMINAL PAIN: 0
FEVER: 0
VOMITING: 0
WEIGHT LOSS: 0
CHILLS: 0
STRIDOR: 0
DIZZINESS: 0
BLOOD IN STOOL: 0
COUGH: 0
HEADACHES: 0
WHEEZING: 0
DIARRHEA: 1
SHORTNESS OF BREATH: 0
MEMORY LOSS: 0
WEAKNESS: 1
HEARTBURN: 0
PALPITATIONS: 0
SORE THROAT: 0

## 2021-09-26 ASSESSMENT — PAIN DESCRIPTION - PAIN TYPE
TYPE: ACUTE PAIN

## 2021-09-26 NOTE — PROGRESS NOTES
Surgery    65-year-old with end-stage liver disease with resolving ileus  Tolerating liquid diet: Advance to regular    Surgery signing off

## 2021-09-26 NOTE — CARE PLAN
The patient is Stable - Low risk of patient condition declining or worsening    Shift Goals  Clinical Goals: advance diet, pain control  Patient Goals: Rest, discharge  Family Goals: Rest    Progress made toward(s) clinical / shift goals: advanced diet to regular, tolerated 2 meals, had 2 bowel movements, minimal pain, stand by assistance with front wheel walker, discharged home with family members, all questions answered, follow up with interventional radiology scheduled.

## 2021-09-26 NOTE — CARE PLAN
The patient is Stable - Low risk of patient condition declining or worsening    Shift Goals  Clinical Goals: Pain control, OOB activity, Rest, maintain skin intergity  Patient Goals: Rest  Family Goals: Rest    Problem: Knowledge Deficit - Standard  Goal: Patient and family/care givers will demonstrate understanding of plan of care, disease process/condition, diagnostic tests and medications  Outcome: Progressing     Problem: Skin Integrity  Goal: Skin integrity is maintained or improved  Outcome: Progressing     Problem: Fall Risk  Goal: Patient will remain free from falls  Outcome: Progressing     Problem: Pain - Standard  Goal: Alleviation of pain or a reduction in pain to the patient’s comfort goal  Outcome: Progressing       Progress made toward(s) clinical / shift goals:      Patient has not complained of pain. Patient repositions himself in bed. Fall education provided and patient does not get out of bed without assistance from staff or from his wife who is at the bed side. Patient updated on current plan of care and verbalizes understanding.     Patient is not progressing towards the following goals:

## 2021-09-26 NOTE — PROGRESS NOTES
Bedside shift report received from day shift RN. Patient is A+Ox3, slightly disoriented to situation; however, he does have a general understanding of everything going on. Patient is resting comfortably in bed. Patient states 0/10 pain and denies need for pain medication at this time. Patient updated on current plan of care and shift goals established. No further needs at this time. Bed locked in lowest position, upper bed rails up, call light and belongings within reach. Hourly rounding in place.

## 2021-09-26 NOTE — DISCHARGE INSTRUCTIONS
Discharge Instructions    Discharged to home by car with relative. Discharged via wheelchair, hospital escort: Yes.  Special equipment needed: Not Applicable    Be sure to schedule a follow-up appointment with your primary care doctor or any specialists as instructed.     Discharge Plan:   Influenza Vaccine Indication: Not indicated: Previously immunized this influenza season and > 8 years of age    I understand that a diet low in cholesterol, fat, and sodium is recommended for good health. Unless I have been given specific instructions below for another diet, I accept this instruction as my diet prescription.   Other diet:regular    Special Instructions: None    · Is patient discharged on Warfarin / Coumadin?   No         Peritonitis    Peritonitis is inflammation of the tissue that lines the abdomen and covers the internal organs (peritoneum). Certain conditions or injuries can cause organs to leak stool, bacteria, fungi, blood, or chemicals, such as bile or other digestive fluids, into the abdomen. When these substances come into contact with the peritoneum, they may cause irritation or infection.  Peritonitis can be a life-threatening infection if not treated promptly. The infection can spread through the bloodstream and affect the whole body (sepsis).  What are the causes?  This condition may be caused by:  · Infection of:  ? The appendix (appendicitis).  ? The pancreas (pancreatitis).  ? Diverticula (diverticulitis). These are small pouches that form in the lining of the digestive tract.  ? The lungs (tuberculosis).  · Ulcers.  · Crohn's disease or ulcerative colitis.  · Cancer.  · Liver disease.  Other possible causes are:  · Injury, such as injury to:  ? The abdomen.  ? The stomach.  ? The esophagus.  · Other infections inside the abdomen or pelvis.  · Pregnancy outside the uterus (tubal pregnancy).  · A procedure that is used to cleanse the blood when the kidneys have stopped working correctly (peritoneal  dialysis).  What are the signs or symptoms?  Symptoms of this condition include:  · Severe pain in the abdomen.  · Hard-feeling abdomen.  · Swelling in the abdomen.  · Fever and chills.  · Nausea and vomiting.  · Poor appetite or no appetite.  · Being unable to pass gas or stool (constipation).  · Diarrhea.  · Passing urine less often.  How is this diagnosed?  This condition may be diagnosed based on the results of a physical exam and medical history. Your health care provider may also do:  · A test on fluid removed from the infected area (paracentesis).  · Lab tests such as blood, urine, or stool tests.  · Imaging tests, such as X-ray, ultrasound, CT scan.  How is this treated?  Treatment for this condition includes treating the symptoms and treating the underlying cause. Usually this condition is treated in the hospital. Treatment may include:  · Antibiotic medicines.  · Surgery to remove infected fluid and tissue.  · Surgery to treat the condition that caused peritonitis, such as removing the appendix to treat appendicitis (appendectomy).  Follow these instructions at home:  Medicines  · Take over-the-counter and prescription medicines only as told by your health care provider.  · If you were prescribed an antibiotic medicine, take it as told by your health care provider. Do not stop taking the antibiotic even if you start to feel better.  · If you were taking prescription medicines for other problems before you developed peritonitis, ask your health care provider when you should start taking these medicines again.  · If told by your health care provider, use a stool softener or laxative.  General instructions  · Do not use any products that contain nicotine or tobacco, such as cigarettes and e-cigarettes. If you need help quitting, ask your health care provider.  · Do not drink alcohol.  · Follow your health care provider's instructions for diet and activity.  · Drink enough fluid to keep your urine pale  yellow.  · Rest as much as possible.  · Keep all follow-up visits as told by your health care provider. This is important.  Contact a health care provider if:  · You develop a fever or chills.  · You are constipated.  · You have nausea, vomiting, or diarrhea.  · Your symptoms change or get worse.  Get help right away if you:  · Have new or worse pain in your abdomen.  · Have new problems with passing urine.  · Develop chest pains or shortness of breath.  · Become very confused or drowsy.  · Have jerky movements that you cannot control (seizures).  Summary  · Peritonitis is inflammation of the tissue that lines the abdomen and covers the internal organs (peritoneum).  · Symptoms include pain and swelling in the abdomen, fever and chills, nausea and vomiting, poor appetite, constipation, diarrhea, or passing urine less often.  · Treatment includes treating the symptoms that you have and treating the underlying cause with medicine or surgery.  This information is not intended to replace advice given to you by your health care provider. Make sure you discuss any questions you have with your health care provider.  Document Released: 11/30/2009 Document Revised: 02/22/2019 Document Reviewed: 01/22/2019  [employer] Patient Education © 2020 [employer]      Peritonitis  Peritonitis    La peritonitis es la inflamación del tejido que reviste el abdomen y cubre los órganos internos (peritoneo). Determinadas afecciones o lesiones pueden hacer que se filtren heces, bacterias, hongos, margot o sustancias químicas, ranjit bilis u otros líquidos digestivos, desde los órganos hacia el abdomen. Cuando estas sustancias entran en contacto con el peritoneo, pueden causar irritación o infección.  La peritonitis es daysi infección potencialmente mortal si no se trata de inmediato. La infección puede diseminarse a través del torrente sanguíneo y afectar todo el cuerpo (sepsis).  ¿Cuáles son las causas?  Esta afección puede ser causada por lo  siguiente:  · Infección en las siguientes zonas:  ? El apéndice (apendicitis).  ? El páncreas (pancreatitis).  ? Divertículos (diverticulitis). Se trata de pequeñas bolsitas que se jerel en el revestimiento del tubo digestivo.  ? Los pulmones (tuberculosis).  · Úlceras.  · Enfermedad de Crohn o colitis ulcerosa.  · Cáncer.  · Enfermedad hepática.  Otras causas posibles incluyen lo siguiente:  · Lesiones, ranjit lesión en:  ? El abdomen.  ? El estómago.  ? El esófago.  · Otras infecciones en la parte interna del abdomen o la pelvis.  · Embarazo fuera del útero (embarazo ectópico).  · Un procedimiento que se utiliza para limpiar la margot cuando los riñones chua dejado de funcionar correctamente (diálisis peritoneal).  ¿Cuáles son los signos o los síntomas?  Los síntomas de esta afección incluyen los siguientes:  · Dolor intenso en el abdomen.  · Abdomen héctor.  · Hinchazón del abdomen.  · Fiebre y escalofríos.  · Náuseas y vómitos.  · Poco apetito o falta de apetito.  · Imposibilidad de eliminar gases o heces (estreñimiento).  · Diarrea.  · Orinar con menos frecuencia.  ¿Cómo se diagnostica?  Esta afección se puede diagnosticar mediante un examen físico y los antecedentes médicos. El médico también podrá realizar lo siguiente:  · Daysi prueba en el líquido extraído de la oscar infectada (paracentesis).  · Pruebas de laboratorio, ranjit análisis de margot, orina o heces.  · Estudios de diagnóstico por imágenes, ranjit daysi radiografía, daysi ecografía o daysi exploración por tomografía computarizada (TC).  ¿Cómo se trata?  El tratamiento de esta afección incluye tratar los síntomas y la causa preexistente. Por lo general, esta afección se trata en el hospital. El tratamiento puede incluir lo siguiente:  · Antibióticos.  · Cirugía para extraer el líquido y el tejido infectados.  · Cirugía para tratar la afección que causó la peritonitis, ranjit la extirpación del apéndice para tratar la apendicitis (apendicectomía).  Siga estas  indicaciones en horvath casa:  Medicamentos  · [place] los medicamentos de venta hemal y los recetados solamente ranjit se lo haya indicado el médico.  · Si le recetaron un antibiótico, tómelo ranjit se lo haya indicado el médico. No deje de nikolai el antibiótico aunque comience a sentirse mejor.  · Si antes de la peritonitis estaba tomando medicamentos recetados para otros problemas, pregunte al médico cuándo debe volver a nikolai estos medicamentos.  · [place] un ablandador de heces o un laxante, si se lo indicó el médico.  Instrucciones generales  · No consuma ningún producto que contenga nicotina o tabaco, ranjit cigarrillos y cigarrillos electrónicos. Si necesita ayuda para dejar de fumar, consulte al médico.  · No jesse alcohol.  · Siga las indicaciones del médico con respecto a la dieta y la actividad.  · Jesse suficiente líquido ranjit para mantener la orina de color amarillo pálido.  · Descanse todo lo que pueda.  · Concurra a todas las visitas de control ranjit se lo haya indicado el médico. [place] es importante.  Comuníquese con un médico si:  · Tiene escalofríos o fiebre.  · Tiene estreñimiento.  · Tiene náuseas, vómitos o diarrea.  · Los síntomas cambian o empeoran.  Solicite ayuda inmediatamente si:  · Tiene un dolor nuevo en el abdomen o natalie existente que empeora.  · Tiene problemas nuevos para orinar.  · Tiene dolor en el pecho o le falta el aire.  · Está muy confundido o somnoliento.  · Tiene movimientos espasmódicos que no puede controlar (convulsiones).  Resumen  · La peritonitis es la inflamación del tejido que reviste el abdomen y cubre los órganos internos (peritoneo).  · Los síntomas incluyen dolor e hinchazón en el abdomen, fiebre y escalofríos, náuseas y vómitos, pérdida del apetito, estreñimiento, diarrea u orinar con menos frecuencia.  · El tratamiento incluye tratar los síntomas que tenga y la causa subyacente con medicamentos o cirugía.  Esta información no tiene ranjit fin reemplazar el consejo del médico. Asegúrese  de hacerle al médico cualquier pregunta que tenga.  Document Released: 03/16/2010 Document Revised: 02/22/2019 Document Reviewed: 02/22/2019  [employer] Patient Education © 2020 [employer]        Paracentesis, Care After  This sheet gives you information about how to care for yourself after your procedure. Your health care provider may also give you more specific instructions. If you have problems or questions, contact your health care provider.  What can I expect after the procedure?  After the procedure, it is common to have a small amount of clear fluid coming from the puncture site.  Follow these instructions at home:  Puncture site care    · Follow instructions from your health care provider about how to take care of your puncture site. Make sure you:  ? Wash your hands with soap and water before and after you change your bandage (dressing). If soap and water are not available, use hand .  ? Change your dressing as told by your health care provider.  · Check your puncture area every day signs of infection. Check for:  ? Redness, swelling, or pain.  ? More fluid or blood.  ? Warmth.  ? Pus or a bad smell.  General instructions  · Return to your normal activities as told by your health care provider. Ask your health care provider what activities are safe for you.  · Take over-the-counter and prescription medicines only as told by your health care provider.  · Do not take baths, swim, or use a hot tub until your health care provider approves. Ask your health care provider if you may take showers. You may only be allowed to take sponge baths.  · Keep all follow-up visits as told by your health care provider. This is important.  Contact a health care provider if:  · You have redness, swelling, or pain at your puncture site.  · You have more fluid or blood coming from your puncture site.  · Your puncture site feels warm to the touch.  · You have pus or a bad smell coming from your puncture site.  · You have  a fever.  Get help right away if:  · You have chest pain or shortness of breath.  · You develop increasing pain, discomfort, or swelling in your abdomen.  · You feel dizzy or light-headed or you faint.  Summary  · After the procedure, it is common to have a small amount of clear fluid coming from the puncture site.  · Follow instructions from your health care provider about how to take care of your puncture site.  · Check your puncture area every day signs of infection.  · Keep all follow-up visits as told by your health care provider.  This information is not intended to replace advice given to you by your health care provider. Make sure you discuss any questions you have with your health care provider.  Document Released: 05/03/2016 Document Revised: 11/19/2019 Document Reviewed: 10/08/2019  [employer] Patient Education © 2020 [employer]      Paracentesis, cuidados posteriores  Paracentesis, Care After  Esta hoja le brandon información sobre cómo cuidarse después del procedimiento. El médico también podrá darle indicaciones más específicas. Comuníquese con el médico si tiene problemas o preguntas.  ¿Qué puedo esperar después del procedimiento?  Luego del procedimiento, es común que salga daysi pequeña cantidad de líquido transparente del lugar de la punción.  Siga estas instrucciones en horvath casa:  Cuidado del lugar de la punción    · Siga las instrucciones del médico acerca del cuidado del lugar de la punción. Asegúrese de hacer lo siguiente:  ? Lávese las sury con agua y jabón antes y después de cambiar la venda (vendaje). Use desinfectante para sury si no dispone de agua y jabón.  ? Cambie el vendaje ranjit se lo haya indicado el médico.  · Controle la oscar de la punción todos los días para detectar signos de infección. Esté atento a los siguientes signos:  ? Enrojecimiento, hinchazón o dolor.  ? Más líquido o margot.  ? Calor.  ? Pus o mal olor.  Indicaciones generales  · Retome elina actividades normales según lo  indicado por el médico. Pregúntele al médico qué actividades son seguras para usted.  · [place] los medicamentos de venta hemal y los recetados solamente ranjit se lo haya indicado el médico.  · No tome nolan de inmersión, no nade ni use el jacuzzi hasta que el médico lo autorice. Pregúntele al médico si puede ducharse. [place] vez solo le permitan darse nolan de esponja.  · Concurra a todas las visitas de control ranjit se lo haya indicado el médico. [place] es importante.  Comuníquese con un médico si:  · Tiene enrojecimiento, hinchazón o dolor en el lugar de la punción.  · Observa daysi mayor cantidad de líquido o margot que provienen del lugar de la punción.  · El lugar de la punción se siente caliente al tacto.  · Tiene pus o percibe mal olor que provienen del lugar de la punción.  · Tiene fiebre.  Solicite ayuda inmediatamente si:  · Siente falta de aire o dolor en el pecho.  · Presenta dolor, molestias o hinchazón en el abdomen.  · Se siente mareado, tiene vahídos o se desmaya.  Resumen  · Luego del procedimiento, es común que salga daysi pequeña cantidad de líquido transparente del lugar de la punción.  · Siga las instrucciones del médico acerca del cuidado del lugar de la punción.  · Controle la oscar de la punción todos los días para detectar signos de infección.  · Concurra a todas las visitas de control ranjit se lo haya indicado el médico.  Esta información no tiene ranjit fin reemplazar el consejo del médico. Asegúrese de hacerle al médico cualquier pregunta que tenga.  Document Released: 09/07/2016 Document Revised: 11/20/2019 Document Reviewed: 11/20/2019  Elsevier Patient Education © 2020 [employer]      Depression / Suicide Risk    As you are discharged from this [facility] facility, it is important to learn how to keep safe from harming yourself.    Recognize the warning signs:  · Abrupt changes in personality, positive or negative- including increase in energy   · Giving away possessions  · Change in eating  patterns- significant weight changes-  positive or negative  · Change in sleeping patterns- unable to sleep or sleeping all the time   · Unwillingness or inability to communicate  · Depression  · Unusual sadness, discouragement and loneliness  · Talk of wanting to die  · Neglect of personal appearance   · Rebelliousness- reckless behavior  · Withdrawal from people/activities they love  · Confusion- inability to concentrate     If you or a loved one observes any of these behaviors or has concerns about self-harm, here's what you can do:  · Talk about it- your feelings and reasons for harming yourself  · Remove any means that you might use to hurt yourself (examples: pills, rope, extension cords, firearm)  · Get professional help from the community (Mental Health, Substance Abuse, psychological counseling)  · Do not be alone:Call your Safe Contact- someone whom you trust who will be there for you.  · Call your local CRISIS HOTLINE 762-6939 or 739-883-4883  · Call your local Children's Mobile Crisis Response Team [place] (724) 782-5549 or www.SOASTA  · Call the toll free National Suicide Prevention Hotlines   · National Suicide Prevention Lifeline 311-039-GTXA (9543)  · National Hope Line Network 800-SUICIDE (353-8198)

## 2021-09-26 NOTE — PROGRESS NOTES
Hospital Medicine Daily Progress Note    Date of Service  9/26/2021    Chief Complaint  Crispin Summesr is a 65 y.o. male admitted 9/24/2021 with AMS.     Hospital Course  Crispin Summers is a 65 y.o. male with PMH of cirrhosis of liver, HTN who presented 9/24/2021 with altered mental status x3 days. He had paracentesis done this past Tuesday and removed 11 L of ascites fluid and since then he has been complaining about abdominal pain and confusion.  He never had similar symptoms before.  Since yesterday he was having nausea and vomiting. He follows with Dr. Oh from GI on a regular basis and he has his paracentesis done once every 3 weeks.    In the ER, he had CT scan done and showed SBO vs ileus. Dr. Gant was consulted.    Interval Problem Update  9/25: Patient alert and oriented to self, hospital and situation. Disoriented to time. Mild asterixis. He reports last BM yesterday. + Flatus today. Reports n/v resolved. Currently, NPO pending paracentesis. LA trending down. IVF stopped as he does appear volume overloaded. On empiric ceftriaxone.   9/26: Patient alert and oriented x 4 this morning. He says he's tolerating clears without issue. 2 BMs overnight. Will advance to regular diet. Cultures are NGTD. VSS on room air.     I have personally seen and examined the patient at bedside. I discussed the plan of care with patient and family.    Consultants/Specialty  general surgery , signed off     Code Status  Full Code    Disposition  Patient is not medically cleared.   Anticipate discharge to to home with close outpatient follow-up.  I have placed the appropriate orders for post-discharge needs.    Review of Systems  Review of Systems   Constitutional: Negative for chills, fever, malaise/fatigue and weight loss.   HENT: Negative for sore throat.    Respiratory: Negative for cough, shortness of breath, wheezing and stridor.    Cardiovascular: Negative for chest pain and palpitations.    Gastrointestinal: Positive for diarrhea. Negative for abdominal pain, blood in stool, heartburn, melena, nausea and vomiting.   Genitourinary: Negative for dysuria, frequency and urgency.   Skin: Negative for itching and rash.   Neurological: Positive for weakness. Negative for dizziness and headaches.   Psychiatric/Behavioral: Negative for memory loss.        Physical Exam  Temp:  [36.3 °C (97.3 °F)-36.9 °C (98.5 °F)] 36.9 °C (98.5 °F)  Pulse:  [67-88] 73  Resp:  [16-18] 18  BP: ()/(55-69) 107/69  SpO2:  [93 %-97 %] 95 %    Physical Exam  Vitals and nursing note reviewed. Exam conducted with a chaperone present (wife at bedside).   Constitutional:       General: He is not in acute distress.     Appearance: He is not ill-appearing.   HENT:      Head: Normocephalic.      Right Ear: External ear normal.      Left Ear: External ear normal.      Nose: No congestion or rhinorrhea.      Mouth/Throat:      Mouth: Mucous membranes are moist.      Pharynx: Oropharynx is clear.   Eyes:      General: Scleral icterus present.         Right eye: No discharge.         Left eye: No discharge.      Pupils: Pupils are equal, round, and reactive to light.   Cardiovascular:      Rate and Rhythm: Normal rate.      Pulses: Normal pulses.   Pulmonary:      Effort: Pulmonary effort is normal. No respiratory distress.      Breath sounds: No stridor. No wheezing or rhonchi.   Abdominal:      General: Bowel sounds are normal. There is distension.      Palpations: Abdomen is soft.      Tenderness: There is no abdominal tenderness. There is no right CVA tenderness, left CVA tenderness or rebound.   Musculoskeletal:         General: No signs of injury.      Cervical back: No rigidity or tenderness.      Right lower leg: No edema.      Left lower leg: No edema.   Skin:     Coloration: Skin is jaundiced.   Neurological:      Mental Status: He is oriented to person, place, and time.      Cranial Nerves: Facial asymmetry present. No  dysarthria.      Comments: No asterixis   Left-sided facial weakness/ ptosis secondary to Bell's palsy (at baseline per patient)     Psychiatric:         Mood and Affect: Mood normal.         Speech: Speech is not delayed.         Behavior: Behavior is not slowed. Behavior is cooperative.         Fluids    Intake/Output Summary (Last 24 hours) at 9/26/2021 1000  Last data filed at 9/25/2021 2035  Gross per 24 hour   Intake 340 ml   Output --   Net 340 ml       Laboratory  Recent Labs     09/24/21  1228 09/25/21  0404 09/26/21  0309   WBC 5.1 4.1* 3.6*   RBC 3.48* 3.12* 3.19*   HEMOGLOBIN 12.7* 11.7* 11.6*   HEMATOCRIT 36.3* 32.9* 33.7*   .3* 105.4* 105.6*   MCH 36.5* 37.5* 36.4*   MCHC 35.0 35.6* 34.4   RDW 66.0* 66.1* 65.8*   PLATELETCT 118* 121* 121*   MPV 10.0 10.0 9.7     Recent Labs     09/24/21  1228 09/25/21  0404 09/26/21  0309   SODIUM 131* 133* 131*   POTASSIUM 5.4 5.0 4.4   CHLORIDE 103 104 104   CO2 18* 19* 17*   GLUCOSE 67 59* 91   BUN 44* 28* 44*   CREATININE 0.88 0.78 0.71   CALCIUM 8.7 8.4* 8.4*     Recent Labs     09/24/21  1228 09/26/21  0309   APTT 36.2*  --    INR 2.51* 2.15*               Imaging  US-PARACENTESIS, ABD WITH IMAGING   Final Result      1. Ultrasound-guided therapeutic paracentesis of the right lower quadrant of the abdominal wall.      2. 6150 mL of fluid withdrawn.      3. 6150 mL withdrawn and 50 mL sent to the laboratory      US-RUQ   Final Result      1.  Cirrhosis and hepatic steatosis      2.  Hepatomegaly      3.  Gallbladder sludge without stones or biliary dilation      CT-ABDOMEN-PELVIS WITH   Final Result      1.  Partial small bowel obstruction versus ileus. Possible transition point in the left abdomen.   2.  Morphologic changes of chronic liver disease with stigmata of portal hypertension including large volume ascites, splenomegaly and splenic vein varices.   3.  Peripheral wedge-shaped hypodensities of the spleen likely representing infarct.   4.  Wall  thickening of the gallbladder likely reactive. Consider further imaging evaluation as indicated.   5.  Prostatomegaly.      CT-HEAD W/O   Final Result      1.  No acute intracranial abnormality is identified.   2.  There are periventricular and subcortical white matter changes present.  This finding is nonspecific and could be from previous small vessel ischemia, demyelination, or gliosis.         DX-CHEST-PORTABLE (1 VIEW)   Final Result      1.  Hypoventilatory changes with bibasilar atelectasis.   2.  Small right pleural effusion.           Assessment/Plan  * Partial small bowel obstruction (HCC)- (present on admission)  Assessment & Plan  Improving  Advance diet to regular   Surgery signed off     Cirrhosis of liver with ascites (HCC)- (present on admission)  Assessment & Plan  S/p paracentesis 9/25 with 1250mL removed    Ascitic fluid cultures in process, currently NGTD    Continue on ceftriaxone  Follow CMP   Resume lasix and spironolactone with hold parameters   Monitor I&Os         Hepatic encephalopathy (HCC)- (present on admission)  Assessment & Plan  Resolved   Mentation at baseline this morning   Continue lactulose TID, titrate to 3 BM per day   Monitor electrolytes       Coagulopathy (HCC)- (present on admission)  Assessment & Plan  Secondary to cirrhosis   No active bleeding on exam   Monitor INR     Thrombocytopenia (HCC)- (present on admission)  Assessment & Plan  Secondary to cirrhosis   PLT at baseline   Monitor and watch for bleeding     Bell's palsy- (present on admission)  Assessment & Plan  Diagnosed in June 2020   Residual left-sided facial weakness   Stable        VTE prophylaxis: SCDs/TEDs    I have performed a physical exam and reviewed and updated ROS and Plan today (9/26/2021). In review of yesterday's note (9/25/2021), there are no changes except as documented above.

## 2021-09-26 NOTE — DISCHARGE SUMMARY
Discharge Summary    CHIEF COMPLAINT ON ADMISSION  Chief Complaint   Patient presents with   • ALOC     Second stage liver failure; last night around 2000 pt became more altered; coffee ground emesis and dark stool.       Reason for Admission  ems     Admission Date  9/24/2021    CODE STATUS  Full Code    HPI & HOSPITAL COURSE  Crispin Summers is a 65 y.o. male with PMH of cirrhosis of liver (followed by Dr. Oh), HTN, and [place] Palsy who presented 9/24/2021 with altered mental status. He had paracentesis done this past Tuesday and removed 11 L of ascites fluid. Since then he has been complaining about abdominal pain and confusion. In the ER, ammonia was elevated at 72 . He had CT scan done and showed SBO vs ileus. Dr. aGnt was consulted. He was taken for paracentesis on 9/25 with 6150mL removed. Ascitic fluid studies c/w SBP. Blood and ascitic cx negative for growth. His diet was advanced per general surgery and her tolerated well. BM achieved. Mentation was back to baseline with resumption of Lactulose. After talking to the patient's daughters , it appears the patient was not taking lactulose because his wife had bought him stool softeners instead. I educated patient and family on the mechanism behind scheduled lactulose and its role in preventing hepatic encephalopathy  . I also discussed the importance of following up with GI as his liver failure as significantly progressed. Daughter said patient had a follow-up appointment in less than 2 weeks with Dr. Oh and they were planning to discuss referral to hepatologist for possible transplant. He was cleared for discharge with instructions to continue ciprofloxacin for the treatment of SBP.       Therefore, he is discharged in guarded and stable condition to home with close outpatient follow-up.    The patient met 2-midnight criteria for an inpatient stay at the time of discharge.    Discharge Date  9/26/2021    FOLLOW UP ITEMS POST  DISCHARGE  N/a    DISCHARGE DIAGNOSES  Principal Problem (Resolved):    Partial small bowel obstruction (HCC) POA: Yes  Active Problems:    Cirrhosis of liver with ascites (HCC) POA: Yes    Thrombocytopenia (HCC) POA: Yes    Coagulopathy (HCC) POA: Yes    Subacute liver failure POA: Yes    Bell's palsy POA: Yes  Resolved Problems:    Hepatic encephalopathy (HCC) POA: Yes    Generalized abdominal pain POA: Unknown    Lactic acidosis POA: Yes    Elevated liver enzymes POA: Yes      FOLLOW UP  Future Appointments   Date Time Provider Department Center   10/5/2021 12:00 PM RR IR US OUTPATIENT 1 [facility]   10/19/2021 11:00 AM RR IR US OUTPATIENT 1 [facility]   11/2/2021 12:00 PM RR IR US OUTPATIENT 1 [facility]   11/16/2021 12:00 PM [facility] IR US OUTPATIENT 1 [facility]   11/30/2021 12:00 PM [facility] IR US OUTPATIENT 1 [facility]   12/14/2021 12:00 PM [facility] IR US OUTPATIENT 1 [facility]   12/28/2021 12:00 PM [facility] IR US OUTPATIENT 1 [facility]     No follow-up provider specified.    MEDICATIONS ON DISCHARGE     Medication List      START taking these medications      Instructions   ciprofloxacin 500 MG Tabs  Commonly known as: CIPRO   Take 1 Tablet by mouth 2 times a day for 6 days.  Dose: 500 mg     lactulose 20 GM/30ML Soln   Take 30 mL by mouth 3 times a day.  Dose: 30 mL        CONTINUE taking these medications      Instructions   furosemide 20 MG Tabs  Commonly known as: LASIX   Take 20 mg by mouth every day.  Dose: 20 mg     omeprazole 40 MG delayed-release capsule  Commonly known as: PRILOSEC   Take 40 mg by mouth every day.  Dose: 40 mg     spironolactone 25 MG Tabs  Commonly known as: ALDACTONE   Take 25 mg by mouth every day.  Dose: 25 mg        STOP taking these medications    losartan 100 MG Tabs  Commonly known as: COZAAR            Allergies  No Known Allergies    DIET  Orders Placed This Encounter   Procedures   • Diet Order Diet: Regular     Standing Status:    Standing     Number of Occurrences:   1     Order Specific Question:   Diet:     Answer:   Regular [1]       ACTIVITY  As tolerated.  Weight bearing as tolerated    CONSULTATIONS  General surgery     PROCEDURES  9/25/2021: paracentesis     LABORATORY  Lab Results   Component Value Date    SODIUM 131 (L) 09/26/2021    POTASSIUM 4.4 09/26/2021    CHLORIDE 104 09/26/2021    CO2 17 (L) 09/26/2021    GLUCOSE 91 09/26/2021    BUN 44 (H) 09/26/2021    CREATININE 0.71 09/26/2021        Lab Results   Component Value Date    WBC 3.6 (L) 09/26/2021    HEMOGLOBIN 11.6 (L) 09/26/2021    HEMATOCRIT 33.7 (L) 09/26/2021    PLATELETCT 121 (L) 09/26/2021        Total time of the discharge process exceeds 60 minutes.

## 2021-09-28 LAB
BACTERIA FLD AEROBE CULT: ABNORMAL
BACTERIA FLD AEROBE CULT: ABNORMAL
GRAM STN SPEC: ABNORMAL
SIGNIFICANT IND 70042: ABNORMAL
SITE SITE: ABNORMAL
SOURCE SOURCE: ABNORMAL

## 2021-09-29 LAB
BACTERIA BLD CULT: NORMAL
BACTERIA BLD CULT: NORMAL
SIGNIFICANT IND 70042: NORMAL
SIGNIFICANT IND 70042: NORMAL
SITE SITE: NORMAL
SITE SITE: NORMAL
SOURCE SOURCE: NORMAL
SOURCE SOURCE: NORMAL

## 2021-10-05 ENCOUNTER — HOSPITAL ENCOUNTER (OUTPATIENT)
Dept: RADIOLOGY | Facility: MEDICAL CENTER | Age: 66
End: 2021-10-05
Attending: INTERNAL MEDICINE
Payer: COMMERCIAL

## 2021-10-05 DIAGNOSIS — K70.31 ALCOHOLIC CIRRHOSIS OF LIVER WITH ASCITES (HCC): ICD-10-CM

## 2021-10-05 PROCEDURE — C1729 CATH, DRAINAGE: HCPCS

## 2021-10-05 PROCEDURE — 49083 ABD PARACENTESIS W/IMAGING: CPT | Performed by: NURSE PRACTITIONER

## 2021-10-05 PROCEDURE — P9047 ALBUMIN (HUMAN), 25%, 50ML: HCPCS

## 2021-10-05 PROCEDURE — 700111 HCHG RX REV CODE 636 W/ 250 OVERRIDE (IP)

## 2021-10-05 RX ORDER — ALBUMIN (HUMAN) 12.5 G/50ML
SOLUTION INTRAVENOUS
Status: COMPLETED
Start: 2021-10-05 | End: 2021-10-05

## 2021-10-05 RX ORDER — ALBUMIN (HUMAN) 12.5 G/50ML
SOLUTION INTRAVENOUS
Status: DISCONTINUED
Start: 2021-10-05 | End: 2021-10-06 | Stop reason: HOSPADM

## 2021-10-05 RX ORDER — ALBUMIN (HUMAN) 12.5 G/50ML
62.5 SOLUTION INTRAVENOUS ONCE
Status: COMPLETED | OUTPATIENT
Start: 2021-10-05 | End: 2021-10-05

## 2021-10-05 RX ADMIN — ALBUMIN (HUMAN) 62.5 G: 5 SOLUTION INTRAVENOUS at 16:45

## 2021-10-05 RX ADMIN — ALBUMIN (HUMAN) 62.5 G: 12.5 SOLUTION INTRAVENOUS at 16:45

## 2021-10-05 NOTE — PROGRESS NOTES
"Patient given [facility] \"Preventing the Spread of Infection\" brochure upon arrival.     US guided therapeutic paracentesis done by Dr. Chaves;(no H&P required as this is a NON SEDATION procedure) RLQ access site; 8950 mL obtained and discarded; pt tolerated the procedure well; pt hemodynamically stable pre/intra/post procedure; all questions and concerns answered prior to d/c; patient provided with all appropriate education for procedure; pt d/c home.     5 bottles of albumin given  "

## 2021-10-15 ENCOUNTER — HOSPITAL ENCOUNTER (OUTPATIENT)
Dept: RADIOLOGY | Facility: MEDICAL CENTER | Age: 66
End: 2021-10-15
Attending: INTERNAL MEDICINE
Payer: COMMERCIAL

## 2021-10-15 DIAGNOSIS — K70.31 ALCOHOLIC CIRRHOSIS OF LIVER WITH ASCITES (HCC): ICD-10-CM

## 2021-10-15 PROCEDURE — C1729 CATH, DRAINAGE: HCPCS

## 2021-10-15 PROCEDURE — P9047 ALBUMIN (HUMAN), 25%, 50ML: HCPCS | Performed by: INTERNAL MEDICINE

## 2021-10-15 PROCEDURE — 700111 HCHG RX REV CODE 636 W/ 250 OVERRIDE (IP): Performed by: INTERNAL MEDICINE

## 2021-10-15 RX ORDER — ALBUMIN (HUMAN) 12.5 G/50ML
SOLUTION INTRAVENOUS
Status: DISPENSED
Start: 2021-10-15 | End: 2021-10-15

## 2021-10-15 RX ORDER — ALBUMIN (HUMAN) 12.5 G/50ML
50 SOLUTION INTRAVENOUS ONCE
Status: COMPLETED | OUTPATIENT
Start: 2021-10-15 | End: 2021-10-15

## 2021-10-15 RX ADMIN — ALBUMIN (HUMAN) 50 G: 5 SOLUTION INTRAVENOUS at 09:05

## 2021-10-15 NOTE — PROCEDURES
Outpatient Interventional Radiology RN Note:    US-guided therapeutic paracentesis performed by Dr. ashford; Procedure explained to patient by MD prior to start and consent obtained, all questions/concerns addressed; No procedural sedation required.    Catheter placed to RLQ by MD; 7700mL obtained and discarded; Puncture site covered with gauze and tegaderm upon completion; PIV placed and albumin protocol initiated post-intervention - 200cc of 25% albumin administered.    Patient tolerated the procedure well, remained hemodynamically stable. Discharge education provided, all questions/concerns addressed; Patient discharged home.

## 2021-11-12 ENCOUNTER — APPOINTMENT (OUTPATIENT)
Dept: RADIOLOGY | Facility: MEDICAL CENTER | Age: 66
End: 2021-11-12
Attending: INTERNAL MEDICINE
Payer: COMMERCIAL

## (undated) DEVICE — KIT ANESTHESIA W/CIRCUIT & 3/LT BAG W/FILTER (20EA/CA)

## (undated) DEVICE — SYRINGE 30 ML LL (56/BX)

## (undated) DEVICE — GLOVES, #7 1/2 BIOGEL M

## (undated) DEVICE — PROTECTOR ULNA NERVE - (36PR/CA)

## (undated) DEVICE — SUTURE 7-0 VICRYL TG140-8 (12PK/BX)

## (undated) DEVICE — SUTURE GENERAL

## (undated) DEVICE — GOWN WARMING STANDARD FLEX - (30/CA)

## (undated) DEVICE — SODIUM CHL IRRIGATION 0.9% 1000ML (12EA/CA)

## (undated) DEVICE — SENSOR SPO2 NEO LNCS ADHESIVE (20/BX) SEE USER NOTES

## (undated) DEVICE — ADHESIVE MASTISOL - (48/BX)

## (undated) DEVICE — WATER IRRIGATION STERILE 1000ML (12EA/CA)

## (undated) DEVICE — CANISTER SUCTION RIGID RED 1500CC (40EA/CA)

## (undated) DEVICE — SLEEVE, VASO, THIGH, MED

## (undated) DEVICE — SPONGE GAUZE NON-STERILE 4X4 - (2000/CA 10PK/CA)

## (undated) DEVICE — KIT  I.V. START (100EA/CA)

## (undated) DEVICE — SET LEADWIRE 5 LEAD BEDSIDE DISPOSABLE ECG (1SET OF 5/EA)

## (undated) DEVICE — CATHETER IV 20 GA X 1-1/4 ---SURG.& SDS ONLY--- (50EA/BX)

## (undated) DEVICE — SUTURE 5-0 VICRYL D/A S-14 18 (12PK/BX)"

## (undated) DEVICE — PEN SKIN MARKER W/RULER - (50EA/BX)

## (undated) DEVICE — LACTATED RINGERS INJ 1000 ML - (14EA/CA 60CA/PF)

## (undated) DEVICE — SUCTION INSTRUMENT YANKAUER BULBOUS TIP W/O VENT (50EA/CA)

## (undated) DEVICE — SUTURE 5-0 MERSIL S-14 (12PK/BX)

## (undated) DEVICE — TUBING CLEARLINK DUO-VENT - C-FLO (48EA/CA)

## (undated) DEVICE — TUBE CONNECTING SUCTION - CLEAR PLASTIC STERILE 72 IN (50EA/CA)

## (undated) DEVICE — ELECTRODE 850 FOAM ADHESIVE - HYDROGEL RADIOTRNSPRNT (50/PK)

## (undated) DEVICE — CANISTER SUCTION 3000ML MECHANICAL FILTER AUTO SHUTOFF MEDI-VAC NONSTERILE LF DISP  (40EA/CA)